# Patient Record
Sex: FEMALE | Race: WHITE | NOT HISPANIC OR LATINO | Employment: FULL TIME | URBAN - METROPOLITAN AREA
[De-identification: names, ages, dates, MRNs, and addresses within clinical notes are randomized per-mention and may not be internally consistent; named-entity substitution may affect disease eponyms.]

---

## 2023-08-07 DIAGNOSIS — G47.00 INSOMNIA, UNSPECIFIED TYPE: ICD-10-CM

## 2023-08-07 RX ORDER — ZOLPIDEM TARTRATE 10 MG/1
1 TABLET ORAL NIGHTLY
COMMUNITY
Start: 2014-12-23 | End: 2023-08-07 | Stop reason: SDUPTHER

## 2023-08-08 RX ORDER — ZOLPIDEM TARTRATE 10 MG/1
10 TABLET ORAL NIGHTLY
Qty: 30 TABLET | Refills: 0 | Status: SHIPPED | OUTPATIENT
Start: 2023-08-08 | End: 2023-11-07 | Stop reason: ALTCHOICE

## 2023-09-28 ENCOUNTER — OFFICE VISIT (OUTPATIENT)
Dept: PRIMARY CARE | Facility: CLINIC | Age: 53
End: 2023-09-28
Payer: COMMERCIAL

## 2023-09-28 VITALS — SYSTOLIC BLOOD PRESSURE: 112 MMHG | DIASTOLIC BLOOD PRESSURE: 72 MMHG

## 2023-09-28 DIAGNOSIS — F51.01 PRIMARY INSOMNIA: Primary | ICD-10-CM

## 2023-09-28 DIAGNOSIS — F41.9 ANXIETY: ICD-10-CM

## 2023-09-28 PROBLEM — S73.191D ACETABULAR LABRUM TEAR, RIGHT, SUBSEQUENT ENCOUNTER: Status: ACTIVE | Noted: 2023-09-28

## 2023-09-28 PROBLEM — E03.9 HYPOTHYROIDISM: Status: ACTIVE | Noted: 2023-09-28

## 2023-09-28 PROCEDURE — 1036F TOBACCO NON-USER: CPT | Performed by: INTERNAL MEDICINE

## 2023-09-28 PROCEDURE — 99213 OFFICE O/P EST LOW 20 MIN: CPT | Performed by: INTERNAL MEDICINE

## 2023-09-28 RX ORDER — LEVOTHYROXINE SODIUM 100 UG/1
1 TABLET ORAL DAILY
COMMUNITY
Start: 2017-06-27 | End: 2023-10-04 | Stop reason: SDUPTHER

## 2023-09-28 RX ORDER — TRAZODONE HYDROCHLORIDE 50 MG/1
50 TABLET ORAL NIGHTLY PRN
Qty: 30 TABLET | Refills: 5 | Status: SHIPPED | OUTPATIENT
Start: 2023-09-28 | End: 2023-10-20

## 2023-09-28 RX ORDER — MELOXICAM 15 MG/1
TABLET ORAL
COMMUNITY
Start: 2023-09-27 | End: 2023-11-07 | Stop reason: ALTCHOICE

## 2023-09-28 RX ORDER — HYDROXYZINE HYDROCHLORIDE 10 MG/1
10 TABLET, FILM COATED ORAL 2 TIMES DAILY PRN
Qty: 30 TABLET | Refills: 0 | Status: SHIPPED | OUTPATIENT
Start: 2023-09-28 | End: 2023-11-13

## 2023-09-28 RX ORDER — ESCITALOPRAM OXALATE 10 MG/1
10 TABLET ORAL DAILY
Qty: 30 TABLET | Refills: 5 | Status: SHIPPED | OUTPATIENT
Start: 2023-09-28 | End: 2023-10-20

## 2023-09-28 NOTE — PROGRESS NOTES
Subjective   Patient ID: Elizabeth Sanchez is a 52 y.o. female.    HPI   patient presents today in follow-up she has had some issues with situational anxiety and insomnia but overall has been very healthy she has had orthopedic issues had surgery for labral tear actually bilateral hips and has been doing pretty well though currently little bit of pain in her right hip was just started back on meloxicam  She has not been sleeping well wakes up frequently and cannot go back to sleep she feels exhausted  Creased stress in her life  Took son to college and not doing well  Daughter looking for college  Mom just diagnosed with lung cancer and will be living with her  while through her treatment the prognosis is not great  3 of hypothyroidism and has not had blood work checked for a while    Tearful and having a hard time coping  She is trying to exercise routinely as this is a great stress reliever for her          Review of Systems    Objective   Physical Exam  Well Developed tearful no acute distress  HEENT exam is unremarkable  Lungs are clear to auscultation and percussion  Cardiovascular regular rate and rhythm    Assessment/Plan   #1 anxiety which I do think is mostly factorial or situational in nature she is going to be seeing a counselor which is good news however we have talked about options and she is just not coping well we will start Lexapro 10 mg daily to take routinely  Trazodone as needed at nighttime for sleep and hydroxyzine on a as needed basis as well she is going to do her blood work in the near future to be sure her thyroid is okay also  2.  Health maintenance  Flu vaccine given today  She is going to return for a comprehensive physical examination and do all of her blood work as well  25 minutes were spent with patient of which greater than 50% was spent in counseling and coordination of care  This note was partially generated using the Dragon voice recognition system.  There may be some incorrect  wording ,grammar, spelling or punctuation errors that were not corrected prior to committing the note to the medical record.

## 2023-09-28 NOTE — PATIENT INSTRUCTIONS
I do feel your anxiety is certainly justified.  You have so much going on in life.  I am glad you are going to be seen a counselor.  I would like to do some blood work in the near future and this will count for your blood work for upcoming physical as well  We are going to start Lexapro (escitalapram) grams daily to be taken every day for the anxiety  We will use trazodone at nighttime for sleep you can take 1/2 to 1 tablet at bedtime.  You can take this every night or more on an as-needed basis as well  I did send a prescription for hydroxyzine 10 mg to take up to twice daily for the extreme anxiety  None of these medications are addictive in nature  Flu vaccine was given today as well  We will have you return for a comprehensive exam in the near future

## 2023-10-04 DIAGNOSIS — E03.8 OTHER SPECIFIED HYPOTHYROIDISM: Primary | ICD-10-CM

## 2023-10-04 RX ORDER — LEVOTHYROXINE SODIUM 100 UG/1
100 TABLET ORAL DAILY
Qty: 90 TABLET | Refills: 3 | Status: SHIPPED | OUTPATIENT
Start: 2023-10-04

## 2023-10-06 ENCOUNTER — TELEPHONE (OUTPATIENT)
Dept: PRIMARY CARE | Facility: CLINIC | Age: 53
End: 2023-10-06
Payer: COMMERCIAL

## 2023-10-13 ENCOUNTER — LAB (OUTPATIENT)
Dept: LAB | Facility: LAB | Age: 53
End: 2023-10-13
Payer: COMMERCIAL

## 2023-10-13 DIAGNOSIS — Z00.00 HEALTH MAINTENANCE EXAMINATION: ICD-10-CM

## 2023-10-13 LAB
25(OH)D3 SERPL-MCNC: 54 NG/ML (ref 30–100)
ALBUMIN SERPL BCP-MCNC: 4.5 G/DL (ref 3.4–5)
ALP SERPL-CCNC: 42 U/L (ref 33–110)
ALT SERPL W P-5'-P-CCNC: 13 U/L (ref 7–45)
ANION GAP SERPL CALC-SCNC: 15 MMOL/L (ref 10–20)
AST SERPL W P-5'-P-CCNC: 15 U/L (ref 9–39)
BACTERIA #/AREA URNS AUTO: ABNORMAL /HPF
BASOPHILS # BLD AUTO: 0.06 X10*3/UL (ref 0–0.1)
BASOPHILS NFR BLD AUTO: 1.6 %
BILIRUB SERPL-MCNC: 1.1 MG/DL (ref 0–1.2)
BUN SERPL-MCNC: 12 MG/DL (ref 6–23)
CALCIUM SERPL-MCNC: 9.5 MG/DL (ref 8.6–10.6)
CHLORIDE SERPL-SCNC: 103 MMOL/L (ref 98–107)
CHOLEST SERPL-MCNC: 163 MG/DL (ref 0–199)
CHOLESTEROL/HDL RATIO: 2.1
CO2 SERPL-SCNC: 26 MMOL/L (ref 21–32)
CREAT SERPL-MCNC: 0.92 MG/DL (ref 0.5–1.05)
CRP SERPL HS-MCNC: 0.4 MG/L
EOSINOPHIL # BLD AUTO: 0.14 X10*3/UL (ref 0–0.7)
EOSINOPHIL NFR BLD AUTO: 3.8 %
ERYTHROCYTE [DISTWIDTH] IN BLOOD BY AUTOMATED COUNT: 13.7 % (ref 11.5–14.5)
EST. AVERAGE GLUCOSE BLD GHB EST-MCNC: 100 MG/DL
GFR SERPL CREATININE-BSD FRML MDRD: 76 ML/MIN/1.73M*2
GLUCOSE SERPL-MCNC: 81 MG/DL (ref 74–99)
HBA1C MFR BLD: 5.1 %
HCT VFR BLD AUTO: 40 % (ref 36–46)
HDLC SERPL-MCNC: 77.6 MG/DL
HGB BLD-MCNC: 12.6 G/DL (ref 12–16)
IMM GRANULOCYTES # BLD AUTO: 0.01 X10*3/UL (ref 0–0.7)
IMM GRANULOCYTES NFR BLD AUTO: 0.3 % (ref 0–0.9)
LDLC SERPL CALC-MCNC: 77 MG/DL
LYMPHOCYTES # BLD AUTO: 1.14 X10*3/UL (ref 1.2–4.8)
LYMPHOCYTES NFR BLD AUTO: 31 %
MCH RBC QN AUTO: 28.1 PG (ref 26–34)
MCHC RBC AUTO-ENTMCNC: 31.5 G/DL (ref 32–36)
MCV RBC AUTO: 89 FL (ref 80–100)
MONOCYTES # BLD AUTO: 0.47 X10*3/UL (ref 0.1–1)
MONOCYTES NFR BLD AUTO: 12.8 %
MUCOUS THREADS #/AREA URNS AUTO: ABNORMAL /LPF
NEUTROPHILS # BLD AUTO: 1.86 X10*3/UL (ref 1.2–7.7)
NEUTROPHILS NFR BLD AUTO: 50.5 %
NON HDL CHOLESTEROL: 85 MG/DL (ref 0–149)
NRBC BLD-RTO: 0 /100 WBCS (ref 0–0)
PLATELET # BLD AUTO: 239 X10*3/UL (ref 150–450)
PMV BLD AUTO: 9.8 FL (ref 7.5–11.5)
POTASSIUM SERPL-SCNC: 4.5 MMOL/L (ref 3.5–5.3)
PROT SERPL-MCNC: 7.5 G/DL (ref 6.4–8.2)
RBC # BLD AUTO: 4.48 X10*6/UL (ref 4–5.2)
RBC #/AREA URNS AUTO: ABNORMAL /HPF
SODIUM SERPL-SCNC: 139 MMOL/L (ref 136–145)
SQUAMOUS #/AREA URNS AUTO: ABNORMAL /HPF
T4 FREE SERPL-MCNC: 1.4 NG/DL (ref 0.78–1.48)
TRIGL SERPL-MCNC: 40 MG/DL (ref 0–149)
TSH SERPL-ACNC: 4.28 MIU/L (ref 0.44–3.98)
VLDL: 8 MG/DL (ref 0–40)
WBC # BLD AUTO: 3.7 X10*3/UL (ref 4.4–11.3)
WBC #/AREA URNS AUTO: ABNORMAL /HPF

## 2023-10-13 PROCEDURE — 80053 COMPREHEN METABOLIC PANEL: CPT

## 2023-10-13 PROCEDURE — 82306 VITAMIN D 25 HYDROXY: CPT

## 2023-10-13 PROCEDURE — 84443 ASSAY THYROID STIM HORMONE: CPT

## 2023-10-13 PROCEDURE — 81001 URINALYSIS AUTO W/SCOPE: CPT

## 2023-10-13 PROCEDURE — 36415 COLL VENOUS BLD VENIPUNCTURE: CPT

## 2023-10-13 PROCEDURE — 86141 C-REACTIVE PROTEIN HS: CPT

## 2023-10-13 PROCEDURE — 83036 HEMOGLOBIN GLYCOSYLATED A1C: CPT

## 2023-10-13 PROCEDURE — 84439 ASSAY OF FREE THYROXINE: CPT

## 2023-10-13 PROCEDURE — 85025 COMPLETE CBC W/AUTO DIFF WBC: CPT

## 2023-10-13 PROCEDURE — 80061 LIPID PANEL: CPT

## 2023-10-20 DIAGNOSIS — F41.9 ANXIETY: ICD-10-CM

## 2023-10-20 DIAGNOSIS — F51.01 PRIMARY INSOMNIA: ICD-10-CM

## 2023-10-20 RX ORDER — TRAZODONE HYDROCHLORIDE 50 MG/1
50 TABLET ORAL NIGHTLY PRN
Qty: 90 TABLET | Refills: 2 | Status: SHIPPED | OUTPATIENT
Start: 2023-10-20 | End: 2023-11-09 | Stop reason: SDUPTHER

## 2023-10-20 RX ORDER — ESCITALOPRAM OXALATE 10 MG/1
10 TABLET ORAL DAILY
Qty: 90 TABLET | Refills: 2 | Status: SHIPPED | OUTPATIENT
Start: 2023-10-20 | End: 2023-11-08 | Stop reason: SDUPTHER

## 2023-10-23 PROBLEM — I49.8 VENTRICULAR BIGEMINY: Status: ACTIVE | Noted: 2023-10-23

## 2023-10-23 PROBLEM — H52.4 PRESBYOPIA: Status: ACTIVE | Noted: 2017-10-09

## 2023-10-23 PROBLEM — R00.2 HEART PALPITATIONS: Status: ACTIVE | Noted: 2023-10-23

## 2023-10-23 PROBLEM — M21.40 PES PLANUS: Status: ACTIVE | Noted: 2023-10-23

## 2023-10-23 PROBLEM — J34.2 DEVIATED NASAL SEPTUM: Status: ACTIVE | Noted: 2023-10-23

## 2023-10-23 PROBLEM — N63.10 LUMP OF RIGHT BREAST: Status: ACTIVE | Noted: 2023-10-23

## 2023-10-23 PROBLEM — J31.0 CHRONIC RHINITIS: Status: ACTIVE | Noted: 2023-10-23

## 2023-10-23 RX ORDER — DOXYCYCLINE 100 MG/1
100 CAPSULE ORAL
COMMUNITY
Start: 2022-10-21 | End: 2023-11-07 | Stop reason: ALTCHOICE

## 2023-10-23 RX ORDER — PHENOL 1.4 %
1 AEROSOL, SPRAY (ML) MUCOUS MEMBRANE DAILY
COMMUNITY
Start: 2018-09-20

## 2023-10-23 RX ORDER — METRONIDAZOLE 10 MG/G
GEL TOPICAL
COMMUNITY
Start: 2022-03-14 | End: 2023-11-07 | Stop reason: ALTCHOICE

## 2023-10-23 RX ORDER — EPINEPHRINE 0.3 MG/.3ML
1 INJECTION SUBCUTANEOUS
COMMUNITY

## 2023-10-23 RX ORDER — LEVONORGESTREL 52 MG/1
INTRAUTERINE DEVICE INTRAUTERINE
COMMUNITY

## 2023-10-23 RX ORDER — ALBUTEROL SULFATE 90 UG/1
2 AEROSOL, METERED RESPIRATORY (INHALATION) EVERY 4 HOURS PRN
COMMUNITY
Start: 2022-11-05 | End: 2023-11-07 | Stop reason: ALTCHOICE

## 2023-10-23 RX ORDER — ASPIRIN 81 MG/1
TABLET ORAL
COMMUNITY
Start: 2022-08-29 | End: 2023-11-07 | Stop reason: ALTCHOICE

## 2023-10-23 RX ORDER — NAPROXEN 500 MG/1
1 TABLET ORAL 2 TIMES DAILY PRN
COMMUNITY
Start: 2020-09-03 | End: 2023-11-07 | Stop reason: ALTCHOICE

## 2023-10-23 RX ORDER — HYDROCORTISONE ACETATE 25 MG/1
25 SUPPOSITORY RECTAL 2 TIMES DAILY
COMMUNITY
Start: 2013-08-27

## 2023-10-25 ENCOUNTER — OFFICE VISIT (OUTPATIENT)
Dept: PRIMARY CARE | Facility: CLINIC | Age: 53
End: 2023-10-25
Payer: COMMERCIAL

## 2023-10-25 ENCOUNTER — OFFICE VISIT (OUTPATIENT)
Dept: ORTHOPEDIC SURGERY | Facility: HOSPITAL | Age: 53
End: 2023-10-25
Payer: COMMERCIAL

## 2023-10-25 VITALS — DIASTOLIC BLOOD PRESSURE: 64 MMHG | SYSTOLIC BLOOD PRESSURE: 100 MMHG

## 2023-10-25 DIAGNOSIS — H00.011 HORDEOLUM EXTERNUM OF RIGHT UPPER EYELID: Primary | ICD-10-CM

## 2023-10-25 DIAGNOSIS — M70.62 TROCHANTERIC BURSITIS, LEFT HIP: ICD-10-CM

## 2023-10-25 DIAGNOSIS — M16.11 PRIMARY OSTEOARTHRITIS OF RIGHT HIP: Primary | ICD-10-CM

## 2023-10-25 DIAGNOSIS — F41.9 ANXIETY: ICD-10-CM

## 2023-10-25 PROCEDURE — 99213 OFFICE O/P EST LOW 20 MIN: CPT | Performed by: PHYSICIAN ASSISTANT

## 2023-10-25 PROCEDURE — 20611 DRAIN/INJ JOINT/BURSA W/US: CPT | Mod: RT | Performed by: PHYSICIAN ASSISTANT

## 2023-10-25 PROCEDURE — 1036F TOBACCO NON-USER: CPT | Performed by: INTERNAL MEDICINE

## 2023-10-25 PROCEDURE — 2500000004 HC RX 250 GENERAL PHARMACY W/ HCPCS (ALT 636 FOR OP/ED): Performed by: PHYSICIAN ASSISTANT

## 2023-10-25 PROCEDURE — 1036F TOBACCO NON-USER: CPT | Performed by: PHYSICIAN ASSISTANT

## 2023-10-25 PROCEDURE — 20610 DRAIN/INJ JOINT/BURSA W/O US: CPT | Mod: LT | Performed by: PHYSICIAN ASSISTANT

## 2023-10-25 PROCEDURE — 2500000005 HC RX 250 GENERAL PHARMACY W/O HCPCS: Performed by: PHYSICIAN ASSISTANT

## 2023-10-25 PROCEDURE — 99213 OFFICE O/P EST LOW 20 MIN: CPT | Performed by: INTERNAL MEDICINE

## 2023-10-25 RX ORDER — LIDOCAINE HYDROCHLORIDE 10 MG/ML
4 INJECTION, SOLUTION EPIDURAL; INFILTRATION; INTRACAUDAL; PERINEURAL
Status: COMPLETED | OUTPATIENT
Start: 2023-10-25 | End: 2023-10-25

## 2023-10-25 RX ORDER — METHYLPREDNISOLONE ACETATE 40 MG/ML
40 INJECTION, SUSPENSION INTRA-ARTICULAR; INTRALESIONAL; INTRAMUSCULAR; SOFT TISSUE
Status: COMPLETED | OUTPATIENT
Start: 2023-10-25 | End: 2023-10-25

## 2023-10-25 RX ORDER — TOBRAMYCIN AND DEXAMETHASONE 3; 1 MG/G; MG/G
0.5 OINTMENT OPHTHALMIC 3 TIMES DAILY
Qty: 15 G | Refills: 0 | Status: SHIPPED | OUTPATIENT
Start: 2023-10-25 | End: 2023-11-07 | Stop reason: ALTCHOICE

## 2023-10-25 RX ADMIN — METHYLPREDNISOLONE ACETATE 40 MG: 40 INJECTION, SUSPENSION INTRA-ARTICULAR; INTRALESIONAL; INTRAMUSCULAR; INTRASYNOVIAL; SOFT TISSUE at 09:13

## 2023-10-25 RX ADMIN — METHYLPREDNISOLONE ACETATE 40 MG: 40 INJECTION, SUSPENSION INTRA-ARTICULAR; INTRALESIONAL; INTRAMUSCULAR; INTRASYNOVIAL; SOFT TISSUE at 09:09

## 2023-10-25 RX ADMIN — LIDOCAINE HYDROCHLORIDE 4 ML: 10 INJECTION, SOLUTION EPIDURAL; INFILTRATION; INTRACAUDAL; PERINEURAL at 09:09

## 2023-10-25 RX ADMIN — LIDOCAINE HYDROCHLORIDE 4 ML: 10 INJECTION, SOLUTION EPIDURAL; INFILTRATION; INTRACAUDAL; PERINEURAL at 09:13

## 2023-10-25 NOTE — PROGRESS NOTES
Subjective   Patient ID: Elizabeth Sanchez is a 52 y.o. female.    HPI  Patient presents today with complaints of stye on her eye over about the last 7 to 10 days she has been using warm compresses and seem to make much of a difference although today it is a little better  She is thrown out all of her old make-up as well  Feels like she is doing okay with the Lexapro from the increased stress her mom had her first procedure stated her home for a while she is just returned to her own house knows this is going to be a long recovery and a whole process but please that she handled first phase as well she did    Review of Systems    Objective   Physical Exam  Well-developed young thin appears younger than age no acute distress  Examination was limited right lower eyelid there is a small erythematous lesion present  Assessment/Plan   Diagnoses and all orders for this visit:  Hordeolum externum of right upper eyelid  -     tobramycin-dexamethasone (Tobradex) ophthalmic ointment; Apply 0.5 inches to right eye 3 times a day for 10 days.  #1 stye or hordeolum right eye has not resolved with conservative means working to try TobraDex ophthalmic ointment we did discuss is okay if little gets in her eye as it is a sterile ointment she does have upcoming appointment with her ophthalmologist if her symptoms would persist  2.  Anxiety stressed doing pretty well with the Lexapro she does feel like it is made a difference  20 minutes were spent with patient of which greater than 50% was spent in counseling and coordination of care  This note was partially generated using the Dragon voice recognition system.  There may be some incorrect wording ,grammar, spelling or punctuation errors that were not corrected prior to committing the note to the medical record.

## 2023-11-07 ENCOUNTER — OFFICE VISIT (OUTPATIENT)
Dept: PRIMARY CARE | Facility: CLINIC | Age: 53
End: 2023-11-07
Payer: COMMERCIAL

## 2023-11-07 VITALS
WEIGHT: 142 LBS | DIASTOLIC BLOOD PRESSURE: 68 MMHG | HEIGHT: 69 IN | BODY MASS INDEX: 21.03 KG/M2 | SYSTOLIC BLOOD PRESSURE: 102 MMHG

## 2023-11-07 DIAGNOSIS — F41.9 ANXIETY: ICD-10-CM

## 2023-11-07 DIAGNOSIS — E03.8 OTHER SPECIFIED HYPOTHYROIDISM: Primary | ICD-10-CM

## 2023-11-07 DIAGNOSIS — Z00.00 ROUTINE HEALTH MAINTENANCE: ICD-10-CM

## 2023-11-07 PROBLEM — S73.191D ACETABULAR LABRUM TEAR, RIGHT, SUBSEQUENT ENCOUNTER: Status: RESOLVED | Noted: 2023-09-28 | Resolved: 2023-11-07

## 2023-11-07 PROCEDURE — UHSPHYS PR UH SELECT PHYSICAL: Performed by: INTERNAL MEDICINE

## 2023-11-07 PROCEDURE — 93000 ELECTROCARDIOGRAM COMPLETE: CPT | Performed by: INTERNAL MEDICINE

## 2023-11-07 PROCEDURE — 1036F TOBACCO NON-USER: CPT | Performed by: INTERNAL MEDICINE

## 2023-11-07 NOTE — PROGRESS NOTES
Physical Exam    Name Elizabeth Sanchez    Date of Service :2023      Elizabeth Sanchez  52 y.o. is here for an annual physical exam  Is an extraordinarily healthy 52-year-old woman  Past medical history most significant for more orthopedic issues  Right hip labral tear status post right hip arthroscopy   Does have a history of fibrocystic breast disease did have diagnostic mammogram and ultrasound this year which was normal  Recommendations were made that she consider self-pay fast breast MRI as well because of her very dense breast tissue  She does not have a family history of breast cancer  She is up-to-date with her gynecologist Dr. Siddiqi  She does have issues with anxiety insomnia for which Lexapro has been helpful really has a lot of situational anxiety and has been under increased stress recently  Hypothyroidism has been on pretty stable dose of Synthroid  She has had a history of palpitations dentally  She remains very active she works out almost on a daily basis and really mixes up her exercise regimen  Overall she is feeling well  history of basal cell skin cancer    Increased stress at this time as her mother's health is an issue had recently been diagnosed with cancer just had pneumonectomy has returned home but knows prognosis is not great.  Her son is struggling he is a freshman in college      Past Medical History:   Diagnosis Date    Encounter for full-term uncomplicated delivery      (spontaneous vaginal delivery)    Other conditions influencing health status     Basal Cell Carcinoma Of Skin, Other Specified Location    Other specified joint disorders, right hip 2022    Femoroacetabular impingement of right hip    Other specified joint disorders, unspecified hip 2020    Femoral acetabular impingement    Other sprain of right hip, initial encounter 2022    Acetabular labrum tear, right, initial encounter    Personal history of other diseases of the digestive system 2013  "   History of hemorrhoids    Personal history of other endocrine, nutritional and metabolic disease     History of hypothyroidism    Varicella without complication     Varicella without complication       Past Surgical History:   Procedure Laterality Date    OTHER SURGICAL HISTORY  12/06/2013    Dental Surgery    OTHER SURGICAL HISTORY  07/02/2019    Rhinoplasty        Social History     Tobacco Use    Smoking status: Never    Smokeless tobacco: Never   Vaping Use    Vaping Use: Never used        Social History     Social History Narrative    Originally from [place]    She worked in [employer] before having children    She is  currently residing in [place]    2 children son who is 19 daughter who is 16    Her diet is healthy    She exercises routinely she does a combination of cardio and resistance training    She has never been a smoker    She occasionally drinks alcohol       Family History   Problem Relation Name Age of Onset    Other (anxiety and depression) Mother      Skin cancer Mother      Hypothyroidism Mother      Hyperlipidemia Mother      Other (sarcomoid carcinoma lung) Mother  78    Skin cancer Father      Hyperlipidemia Father      Skin cancer Brother      Hypertension Other Family Hx     Other (Cardiac failure) Other Family Hx         /68   Ht 1.753 m (5' 9\")   Wt 64.4 kg (142 lb)   BMI 20.97 kg/m²     Physical Exam  Physical examination  Reveals a well-developed woman in no acute distress    appearance is younger than age she is thin  HEENT exam  Extraocular motion is intact  Tympanic membranes and external auditory canals are normal  Oropharynx is normal  There is no cervical lymphadenopathy appreciated  The thyroid is within normal limits    Lungs    clear to auscultation and percussion    Cardiovascular   regular rate and rhythm  No murmurs rubs or gallops are appreciated    Breast examination   No dominant masses nipple discharge or axillary lymphadenopathy is appreciated    Abdomen " "  soft nontender bowel sounds are positive   there is no organomegaly noted      Periphery  Pulses are present without deficits noted  No peripheral edema is noted    Musculoskeletal  Gait is normal  Is no joint erythema or swelling noted  Range of motion is within normal limits  Strength is 5 of 5 without deficits noted    Dermatology  No concerning skin lesions are noted    Neurology  No deficits are noted  Judgment appears appropriate  Mood and affect are appropriate                        No lab exists for component: \"LABALBU\"                       No lab exists for component: \"UAPPEAR\", \"UCOLOR\"  No components found for: \"UA\"  Lab on 10/13/2023   Component Date Value Ref Range Status    WBC 10/13/2023 3.7 (L)  4.4 - 11.3 x10*3/uL Final    nRBC 10/13/2023 0.0  0.0 - 0.0 /100 WBCs Final    RBC 10/13/2023 4.48  4.00 - 5.20 x10*6/uL Final    Hemoglobin 10/13/2023 12.6  12.0 - 16.0 g/dL Final    Hematocrit 10/13/2023 40.0  36.0 - 46.0 % Final    MCV 10/13/2023 89  80 - 100 fL Final    MCH 10/13/2023 28.1  26.0 - 34.0 pg Final    MCHC 10/13/2023 31.5 (L)  32.0 - 36.0 g/dL Final    RDW 10/13/2023 13.7  11.5 - 14.5 % Final    Platelets 10/13/2023 239  150 - 450 x10*3/uL Final    MPV 10/13/2023 9.8  7.5 - 11.5 fL Final    Neutrophils % 10/13/2023 50.5  40.0 - 80.0 % Final    Immature Granulocytes %, Automated 10/13/2023 0.3  0.0 - 0.9 % Final    Immature Granulocyte Count (IG) includes promyelocytes, myelocytes and metamyelocytes but does not include bands. Percent differential counts (%) should be interpreted in the context of the absolute cell counts (cells/UL).    Lymphocytes % 10/13/2023 31.0  13.0 - 44.0 % Final    Monocytes % 10/13/2023 12.8  2.0 - 10.0 % Final    Eosinophils % 10/13/2023 3.8  0.0 - 6.0 % Final    Basophils % 10/13/2023 1.6  0.0 - 2.0 % Final    Neutrophils Absolute 10/13/2023 1.86  1.20 - 7.70 x10*3/uL Final    Percent differential counts (%) should be interpreted in the context of the absolute " cell counts (cells/uL).    Immature Granulocytes Absolute, Au* 10/13/2023 0.01  0.00 - 0.70 x10*3/uL Final    Lymphocytes Absolute 10/13/2023 1.14 (L)  1.20 - 4.80 x10*3/uL Final    Monocytes Absolute 10/13/2023 0.47  0.10 - 1.00 x10*3/uL Final    Eosinophils Absolute 10/13/2023 0.14  0.00 - 0.70 x10*3/uL Final    Basophils Absolute 10/13/2023 0.06  0.00 - 0.10 x10*3/uL Final    Glucose 10/13/2023 81  74 - 99 mg/dL Final    Sodium 10/13/2023 139  136 - 145 mmol/L Final    Potassium 10/13/2023 4.5  3.5 - 5.3 mmol/L Final    Chloride 10/13/2023 103  98 - 107 mmol/L Final    Bicarbonate 10/13/2023 26  21 - 32 mmol/L Final    Anion Gap 10/13/2023 15  10 - 20 mmol/L Final    Urea Nitrogen 10/13/2023 12  6 - 23 mg/dL Final    Creatinine 10/13/2023 0.92  0.50 - 1.05 mg/dL Final    eGFR 10/13/2023 76  >60 mL/min/1.73m*2 Final    Calculations of estimated GFR are performed using the 2021 CKD-EPI Study Refit equation without the race variable for the IDMS-Traceable creatinine methods.  https://jasn.asnjournals.org/content/early/2021/09/22/ASN.9982552084    Calcium 10/13/2023 9.5  8.6 - 10.6 mg/dL Final    Albumin 10/13/2023 4.5  3.4 - 5.0 g/dL Final    Alkaline Phosphatase 10/13/2023 42  33 - 110 U/L Final    Total Protein 10/13/2023 7.5  6.4 - 8.2 g/dL Final    AST 10/13/2023 15  9 - 39 U/L Final    Bilirubin, Total 10/13/2023 1.1  0.0 - 1.2 mg/dL Final    ALT 10/13/2023 13  7 - 45 U/L Final    Patients treated with Sulfasalazine may generate falsely decreased results for ALT.    CRP, High Sensitivity 10/13/2023 0.4 (L)  >=1.0 mg/L Final    Hemoglobin A1C 10/13/2023 5.1  see below % Final    Estimated Average Glucose 10/13/2023 100  Not Established mg/dL Final    Cholesterol 10/13/2023 163  0 - 199 mg/dL Final          Age      Desirable   Borderline High   High     0-19 Y     0 - 169       170 - 199     >/= 200    20-24 Y     0 - 189       190 - 224     >/= 225         >24 Y     0 - 199       200 - 239     >/= 240   **All  ranges are based on fasting samples. Specific   therapeutic targets will vary based on patient-specific   cardiac risk.    Pediatric guidelines reference:Pediatrics 2011, 128(S5).Adult guidelines reference: NCEP ATPIII Guidelines,MALCOLM 2001, 258:2486-97    Venipuncture immediately after or during the administration of Metamizole may lead to falsely low results. Testing should be performed immediately prior to Metamizole dosing.    HDL-Cholesterol 10/13/2023 77.6  mg/dL Final      Age       Very Low   Low     Normal    High    0-19 Y    < 35      < 40     40-45     ----  20-24 Y    ----     < 40      >45      ----        >24 Y      ----     < 40     40-60      >60      Cholesterol/HDL Ratio 10/13/2023 2.1   Final      Ref Values  Desirable  < 3.4  High Risk  > 5.0    LDL Calculated 10/13/2023 77  <=99 mg/dL Final                                Near   Borderline      AGE      Desirable  Optimal    High     High     Very High     0-19 Y     0 - 109     ---    110-129   >/= 130     ----    20-24 Y     0 - 119     ---    120-159   >/= 160     ----      >24 Y     0 -  99   100-129  130-159   160-189     >/=190      VLDL 10/13/2023 8  0 - 40 mg/dL Final    Triglycerides 10/13/2023 40  0 - 149 mg/dL Final       Age         Desirable   Borderline High   High     Very High   0 D-90 D    19 - 174         ----         ----        ----  91 D- 9 Y     0 -  74        75 -  99     >/= 100      ----    10-19 Y     0 -  89        90 - 129     >/= 130      ----    20-24 Y     0 - 114       115 - 149     >/= 150      ----         >24 Y     0 - 149       150 - 199    200- 499    >/= 500    Venipuncture immediately after or during the administration of Metamizole may lead to falsely low results. Testing should be performed immediately prior to Metamizole dosing.    Non HDL Cholesterol 10/13/2023 85  0 - 149 mg/dL Final          Age       Desirable   Borderline High   High     Very High     0-19 Y     0 - 119       120 - 144     >/= 145     >/= 160    20-24 Y     0 - 149       150 - 189     >/= 190      ----         >24 Y    30 mg/dL above LDL Cholesterol goal      Thyroid Stimulating Hormone 10/13/2023 4.28 (H)  0.44 - 3.98 mIU/L Final    WBC, Urine 10/13/2023 1-5  1-5, NONE /HPF Final    RBC, Urine 10/13/2023 1-2  NONE, 1-2, 3-5 /HPF Final    Squamous Epithelial Cells, Urine 10/13/2023 1-9 (SPARSE)  Reference range not established. /HPF Final    Bacteria, Urine 10/13/2023 1+ (A)  NONE SEEN /HPF Final    Mucus, Urine 10/13/2023 1+  Reference range not established. /LPF Final    Vitamin D, 25-Hydroxy, Total 10/13/2023 54  30 - 100 ng/mL Final    Thyroxine, Free 10/13/2023 1.40  0.78 - 1.48 ng/dL Final     [unfilled]   No results found.   The 10-year ASCVD risk score (Keaton SIMENTAL, et al., 2019) is: 0.4%    Values used to calculate the score:      Age: 52 years      Sex: Female      Is Non- : No      Diabetic: No      Tobacco smoker: No      Systolic Blood Pressure: 102 mmHg      Is BP treated: No      HDL Cholesterol: 77.6 mg/dL      Total Cholesterol: 163 mg/dL   .  ECG: normal sinus rhythm, no blocks or conduction defects, no ischemic changes.     Problem List Items Addressed This Visit       Hypothyroidism - Primary    Anxiety     Other Visit Diagnoses       Routine health maintenance        Relevant Orders    ECG 12 Lead            Assessment/Plan   #1 anxiety she is doing really well with the Lexapro we will continue Lexapro she has rarely used hydroxyzine not sure how well it worked she does have a lot of stress in her life right now  2.  Insomnia trazodone is working well is fine to use it every day  3.  Hypothyroidism thyroid functions are normal  4.  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  Orthopedic issues sports related injuries seems to be doing well she had labral repair she does have aches and pains on occasion but she is very active  6.  Health maintenance  Congratulated her  on a very healthy lifestyle she exercises routinely she eats healthfully her blood work is all acceptable  She is up-to-date with gynecology  Up-to-date with mammogram we discussed considering screening with fast breast MRI self-pay she will consider although we did discuss that we have to be cautious in premenopausal women as can get false positives  She has had colonoscopy she is a 5-year repeat because of polyps so repeat 2028  Shingrix vaccination is recommended

## 2023-11-07 NOTE — PATIENT INSTRUCTIONS
It was good to see you.  You are doing a good job with your overall health care.   Your blood work is all acceptable  I am glad the Lexapro is working well for you  Stay up-to-date with gynecology  Consider self-pay rapid breast MRI because of your dense breast however I agree you do not have any strong family history of breast cancer  I believe this is a $250 out-of-pocket cost  Continue routine regular exercise I think you are doing great in the way you mix up your exercise  Repeat colonoscopy 2028  Shingrix or shingles vaccination is recommended. This is a series of 2 vaccinations. The second vaccination is given 2-6 months following the first.   I encourage you to stay active and healthy, and to follow these healthy habits:     Try to increase your intake of fish such as salmon and tuna and try to get 2 to 3 servings of fish per week.  Increase your intake of plant-based protein listed here:    Unprocessed nuts, walnuts, or almonds, Nuts and Seeds.      Green vegetables such as Broccoli, Peas, Greens, Spinach    Beans, Chickpeas, & Lentils    Other sources include Potatoes, Quinoa, Seaweed, Soymilk, Tempeh, and Tofu.  Increase food s higher in flavonoids found in black tea, green tea, apples, nuts, citrus fruit, berries, and dark chocolate.  You should avoid fried foods, and sugary or starchy foods and sugary drinks, and void saturated fats.    Try not to dine at restaurants frequently  , and avoid fast food restaurants.      Try to get restful sleep approximately 7-9 hours every night.  Work towards getting 30 minutes of  moderate intensity exercise 4 to 5 days per week.  You should also try to exercise at least one hour per day with light walking.

## 2023-11-08 DIAGNOSIS — F41.9 ANXIETY: ICD-10-CM

## 2023-11-08 RX ORDER — ESCITALOPRAM OXALATE 10 MG/1
10 TABLET ORAL DAILY
Qty: 90 TABLET | Refills: 3 | Status: SHIPPED | OUTPATIENT
Start: 2023-11-08

## 2023-11-09 DIAGNOSIS — F51.01 PRIMARY INSOMNIA: ICD-10-CM

## 2023-11-09 RX ORDER — TRAZODONE HYDROCHLORIDE 50 MG/1
50 TABLET ORAL NIGHTLY PRN
Qty: 90 TABLET | Refills: 2 | Status: SHIPPED | OUTPATIENT
Start: 2023-11-09 | End: 2024-11-08

## 2023-11-13 DIAGNOSIS — F41.9 ANXIETY: ICD-10-CM

## 2023-11-13 RX ORDER — HYDROXYZINE HYDROCHLORIDE 10 MG/1
10 TABLET, FILM COATED ORAL 2 TIMES DAILY PRN
Qty: 30 TABLET | Refills: 0 | Status: SHIPPED | OUTPATIENT
Start: 2023-11-13 | End: 2023-11-21 | Stop reason: SDUPTHER

## 2023-11-14 NOTE — PROGRESS NOTES
Physical Therapy  Physical Therapy Orthopedic Evaluation    Patient Name: Elizabeth Sanchez  MRN: [id number]  Today's Date: 11/15/2023  Time Calculation  Start Time: 1400  Stop Time: 1455  Time Calculation (min): 55 min    Insurance:  Visit number: 1 of 38  Authorization info: NAN  Insurance Type: Medical [employer]    General:  Reason for visit: Bilateral hip pain   Referred by: Francoise Chung    Current Problem  1. Bilateral hip pain  Follow Up In Physical Therapy      2. Hip stiffness  Follow Up In Physical Therapy      3. Muscle weakness  Follow Up In Physical Therapy      4. Stiffness of right hip joint [M25.651]  Follow Up In Physical Therapy          Precautions: Hx R hip scope July 2022       Medical History Form: Reviewed (scanned into chart)    Subjective:     Chief Complaint: 53 year old female presents with complaints of bilateral hip pain. Previously treated patient in 2022 after her right hip arthroscopy labral repair. Patient did very well with rehab and was pain free for quite some time. Recently both of her hips have begun bothering her. Right groin and left lateral hip. Pt recently had injections and referred for PT. Pt is an avid exerciser. The injection in her right hip helped a great deal but the injection in her left greater trochanter.     RADHA: Insidious    Current Condition:   Better    Pain:  Pain Assessment: 0-10  Pain Score: 2  Location: right groin, left lateral hip/bursa  2/10 current  6/10 worst at night   Description: sharp, throb, ache   Aggravating Factors: sleeping  Exercise- yoga, pickle ball, spin     Relevant Information (PMH & Previous Tests/Imaging): bilateral mild OA   Previous Interventions/Treatments: None    Prior Level of Function (PLOF)  Patient previously independent with all ADLs    Patients Living Environment: Reviewed and no concern  PMH: hypothyroidism, anxiety -both managed   Primary Language: English    Patient's Goal(s) for Therapy: reduce pain     Red Flags: Do you have  any of the following? No  Fever/chills, unexplained weight changes, dizziness/fainting, unexplained change in bowel or bladder functions, unexplained malaise or muscle weakness, night pain/sweats, numbness or tingling    Objective:  Objective       ROM    Lumbar AROM (%)    WNL      Hip AROM (Degrees)      (R)  (L)  Flexion: 130   130   Extension: WNL  WNL    Abduction: WNL  WNL    Adduction: WNL  Slight restriction    ER:  42  35    IR:  45  45        Hip PROM (Degrees)      (R)  (L)   ER:  50  45            Strength Testing    Core/Abdominals: 4+/5    Hip      (R)  (L)  Flexion: 5  5     Extension: 4+  4+    Abduction: 5  4+    Adduction: 4+  4+    ER:  4+  4+    IR:  5  5          Functional Screening  Squat: WNL, mild right hip impingement > 90 deg      Posture: + L hip upslip       Palpation: TTP with increased tone L TFL, glute med, proximal quad/distal psoas       Joint Mobility: inferior capsular restriction R hip       Flexibility: mod limited L ITB, hip flexor      Gait: WNL        Special Tests    Ferny's Test: + L restriction   Chandler Test: L ITB restriction  SCOTTIE Test: normal  Hip Scour: neg  Hip Impingement Test: neg  Trendelenburg Test: neg  Glute med derotation normal       Outcome Measures:  Other Measures  Lower Extremity Funtional Score (LEFS): 72/80     EDUCATION: home exercise program, plan of care, activity modifications, pain management, and injury pathology, dry needling indications and risks        Goals: Set and discussed today  Active       PT Problem       PT Goal 1       Start:  11/15/23    Expected End:  02/07/24       Patient will report <4/10 pain in R hip by 4 weeks at night, < 2/10 night pain by 6 weeks   AROM L hip ER 45 degrees by 4-6 weeks, smooth right hip flexion AROM to demonstrate improved joint mechanics to perform ADLs   B hip strength: abduction, ER, extension, glute max  5/5 MMT to demonstrate pelvic stability during ADLs and higher level functional tasks   LEFS > 76/80  "to demonstrate improved ability to perform ADLs by 6 - 8 weeks                Plan of care was developed with input and agreement by the patient      Treatment Performed:    Therapeutic Exercise:    5 min  Reviewed glute bridge, clamshells, s/l hip abduction  CKTC stretch 3 x 30 \" B   Foam rolling TFL, gluteals, ITB     Manual Therapy:    25 min  DN: 0.3x85 mm to L TFL, glute med, proximal quad  Belted hip mobs B ER/IR, caudal R   Theragun L quad/ITB      Assessment: Patient presents with signs and symptoms consistent with right hip mobility restrictions and left hip greater trochanteric bursitis. Limitations include hip weakness, mobility restrictions, ITB limitations and this is resulting in limited participation in pain-free ADLs and inability to perform at their prior level of function. Pt would benefit from physical therapy to address the impairments found & listed previously in the objective section in order to return to safe and pain-free ADLs and prior level of function.     Reduced tone after needling.     Plan:     Planned Interventions include: therapeutic exercise, self-care home management, manual therapy, therapeutic activities, gait training, neuromuscular coordination, vasopneumatic, dry needling, aquatic therapy  Frequency: 1-2 x Week  Duration: 8 Weeks      Radha Mccoy, PT   "

## 2023-11-15 ENCOUNTER — EVALUATION (OUTPATIENT)
Dept: PHYSICAL THERAPY | Facility: HOSPITAL | Age: 53
End: 2023-11-15
Payer: COMMERCIAL

## 2023-11-15 DIAGNOSIS — M25.551 BILATERAL HIP PAIN: Primary | ICD-10-CM

## 2023-11-15 DIAGNOSIS — M25.552 BILATERAL HIP PAIN: Primary | ICD-10-CM

## 2023-11-15 DIAGNOSIS — M25.659 HIP STIFFNESS: ICD-10-CM

## 2023-11-15 DIAGNOSIS — M62.81 MUSCLE WEAKNESS: ICD-10-CM

## 2023-11-15 DIAGNOSIS — M25.651 STIFFNESS OF RIGHT HIP JOINT: ICD-10-CM

## 2023-11-15 PROCEDURE — 97140 MANUAL THERAPY 1/> REGIONS: CPT | Mod: GP

## 2023-11-15 PROCEDURE — 97161 PT EVAL LOW COMPLEX 20 MIN: CPT | Mod: GP

## 2023-11-15 ASSESSMENT — PAIN - FUNCTIONAL ASSESSMENT: PAIN_FUNCTIONAL_ASSESSMENT: 0-10

## 2023-11-15 ASSESSMENT — ENCOUNTER SYMPTOMS
LOSS OF SENSATION IN FEET: 0
OCCASIONAL FEELINGS OF UNSTEADINESS: 0
DEPRESSION: 0

## 2023-11-15 ASSESSMENT — PAIN SCALES - GENERAL: PAINLEVEL_OUTOF10: 2

## 2023-11-21 DIAGNOSIS — F41.9 ANXIETY: ICD-10-CM

## 2023-11-21 RX ORDER — HYDROXYZINE HYDROCHLORIDE 10 MG/1
10 TABLET, FILM COATED ORAL 2 TIMES DAILY PRN
Qty: 30 TABLET | Refills: 0 | Status: SHIPPED | OUTPATIENT
Start: 2023-11-21 | End: 2023-12-01

## 2023-11-21 RX ORDER — HYDROXYZINE HYDROCHLORIDE 10 MG/1
10 TABLET, FILM COATED ORAL 2 TIMES DAILY PRN
Qty: 180 TABLET | Refills: 1 | OUTPATIENT
Start: 2023-11-21

## 2023-12-05 ENCOUNTER — APPOINTMENT (OUTPATIENT)
Dept: PHYSICAL THERAPY | Facility: HOSPITAL | Age: 53
End: 2023-12-05
Payer: COMMERCIAL

## 2023-12-15 ENCOUNTER — TREATMENT (OUTPATIENT)
Dept: PHYSICAL THERAPY | Facility: HOSPITAL | Age: 53
End: 2023-12-15
Payer: COMMERCIAL

## 2023-12-15 DIAGNOSIS — M25.652 STIFFNESS OF LEFT HIP JOINT: ICD-10-CM

## 2023-12-15 DIAGNOSIS — M25.551 BILATERAL HIP PAIN: Primary | ICD-10-CM

## 2023-12-15 DIAGNOSIS — M25.552 BILATERAL HIP PAIN: Primary | ICD-10-CM

## 2023-12-15 DIAGNOSIS — M25.651 STIFFNESS OF RIGHT HIP JOINT: ICD-10-CM

## 2023-12-15 DIAGNOSIS — M62.81 MUSCLE WEAKNESS: ICD-10-CM

## 2023-12-15 PROCEDURE — 97110 THERAPEUTIC EXERCISES: CPT | Mod: GP

## 2023-12-15 PROCEDURE — 97140 MANUAL THERAPY 1/> REGIONS: CPT | Mod: GP

## 2023-12-15 ASSESSMENT — PAIN SCALES - GENERAL: PAINLEVEL_OUTOF10: 2

## 2023-12-15 ASSESSMENT — PAIN - FUNCTIONAL ASSESSMENT: PAIN_FUNCTIONAL_ASSESSMENT: 0-10

## 2024-04-12 ENCOUNTER — TELEPHONE (OUTPATIENT)
Dept: PRIMARY CARE | Facility: CLINIC | Age: 54
End: 2024-04-12
Payer: COMMERCIAL

## 2024-04-12 NOTE — TELEPHONE ENCOUNTER
Pt is calling in regards to taking lexapro and trazodone and is having some questions about them - RAZIA

## 2024-05-07 ENCOUNTER — TREATMENT (OUTPATIENT)
Dept: PHYSICAL THERAPY | Facility: HOSPITAL | Age: 54
End: 2024-05-07

## 2024-05-07 DIAGNOSIS — M25.651 STIFFNESS OF RIGHT HIP JOINT: ICD-10-CM

## 2024-05-07 DIAGNOSIS — M25.652 STIFFNESS OF LEFT HIP JOINT: ICD-10-CM

## 2024-05-07 DIAGNOSIS — M25.551 BILATERAL HIP PAIN: Primary | ICD-10-CM

## 2024-05-07 DIAGNOSIS — M62.81 MUSCLE WEAKNESS: ICD-10-CM

## 2024-05-07 DIAGNOSIS — M25.552 BILATERAL HIP PAIN: Primary | ICD-10-CM

## 2024-05-07 PROCEDURE — 4200000004 HC PT PHASE II 15 MIN CHG: Mod: GP

## 2024-05-07 NOTE — PROGRESS NOTES
Phase II Visit - Rehabilitation Services    Patient Name: Elizabeth Sanchez  MRN: [id number]  Today's Date: 5/7/2024         Visit number: 1      Current Problem:  Patient is being seen at [facility] for cash-based (phase II) visit for: R hip pain dry needling         Subjective:  Patient familiar to me for treatment of right hip pain/surgery returns with acute R hip pain. Patient is training for 1.5 mile open water swim in beginning on June. Last week she swam for 50 minutes straight (1.5 miles) and had hip pain afterwards. Complaints of anterior hip pain with ambulation, feels like it is catching.          Objective:  Pain: 3-10/10   Grabs with hip extension with walking   Pt training for 1.5 mile swim   + hypertonicity R psoas, proximal rectus   Antalgic gait entering clinic    Treatment Performed:  DN 0.3 x 60 mm to R psoas, adductors  Belted lateral hip glide grade III and IR MWM         Assessment:  Patient educated on dry needling precautions, indications and contraindications. Patient is aware of risks and benefits of procedure and provided informed consent. No adverse reactions to needling. No antalgia with gait after session with reduced pain. Continues to have mild hypertonicity of psoas       Plan:  Continue with needling PRN.       Radha Mccoy, PT

## 2024-05-10 NOTE — PROGRESS NOTES
Phase II Visit - Rehabilitation Services    Patient Name: Elizabeth Sanchez  MRN: [id number]  Today's Date: 5/13/2024         Visit number: 2      Current Problem:  Patient is being seen at [facility] for cash-based (phase II) visit for: R hip pain dry needling         Subjective:  Patient reports her hip felt better after last session. She has swam once since last session and felt it a little bit afterwards. Continues to have some soreness in the front of the hip.         Objective:  Pain: 4/10   Grabs with hip extension with walking   Pt training for 1.5 mile swim   + hypertonicity R psoas, proximal rectus, glute med      Treatment Performed:  DN 0.3 x 60 mm to R psoas, glute med   Caudal hip scooping mob      Assessment:  Patient had trigger points which were reduced after needling. Decreased pain and improved gait after session.       Plan:  Continue with needling PRN.       Radha Mccoy, PT

## 2024-05-13 ENCOUNTER — TREATMENT (OUTPATIENT)
Dept: PHYSICAL THERAPY | Facility: HOSPITAL | Age: 54
End: 2024-05-13

## 2024-05-13 DIAGNOSIS — M25.552 BILATERAL HIP PAIN: Primary | ICD-10-CM

## 2024-05-13 DIAGNOSIS — M62.81 MUSCLE WEAKNESS: ICD-10-CM

## 2024-05-13 DIAGNOSIS — M25.651 STIFFNESS OF RIGHT HIP JOINT: ICD-10-CM

## 2024-05-13 DIAGNOSIS — M25.551 BILATERAL HIP PAIN: Primary | ICD-10-CM

## 2024-05-13 PROCEDURE — 4200000004 HC PT PHASE II 15 MIN CHG: Mod: GP

## 2024-05-14 ENCOUNTER — TELEPHONE (OUTPATIENT)
Dept: PRIMARY CARE | Facility: CLINIC | Age: 54
End: 2024-05-14
Payer: COMMERCIAL

## 2024-05-14 DIAGNOSIS — F41.9 ANXIETY: Primary | ICD-10-CM

## 2024-05-14 RX ORDER — DULOXETIN HYDROCHLORIDE 30 MG/1
30 CAPSULE, DELAYED RELEASE ORAL DAILY
Qty: 30 CAPSULE | Refills: 11 | Status: SHIPPED | OUTPATIENT
Start: 2024-05-14 | End: 2025-05-14

## 2024-05-14 NOTE — PROGRESS NOTES
Spoke with patient by phone was using Lexapro which worked well for her anxiety however she had a lot of issues with sexual dysfunction decreased libido stopping the Lexapro did resolve this issue.  However she is feeling more anxious is interested in trying something else and we have discussed options we will try duloxetine 30 mg daily prescription sent to pharmacy

## 2024-05-14 NOTE — TELEPHONE ENCOUNTER
She stopped Lexapro and her libido is much better is there something else she can try instead?  [facility] on file

## 2024-06-03 ENCOUNTER — TREATMENT (OUTPATIENT)
Dept: PHYSICAL THERAPY | Facility: HOSPITAL | Age: 54
End: 2024-06-03

## 2024-06-03 DIAGNOSIS — M62.81 MUSCLE WEAKNESS: ICD-10-CM

## 2024-06-03 DIAGNOSIS — M25.651 STIFFNESS OF RIGHT HIP JOINT: ICD-10-CM

## 2024-06-03 DIAGNOSIS — M25.552 BILATERAL HIP PAIN: Primary | ICD-10-CM

## 2024-06-03 DIAGNOSIS — M25.551 BILATERAL HIP PAIN: Primary | ICD-10-CM

## 2024-06-03 PROCEDURE — 4200000004 HC PT PHASE II 15 MIN CHG: Mod: GP

## 2024-06-03 NOTE — PROGRESS NOTES
Phase II Visit - Rehabilitation Services    Patient Name: Elizabeth Sanchez  MRN: [id number]  Today's Date: 6/3/2024  Time Calculation  Start Time: 1505  Stop Time: 1520  Time Calculation (min): 15 min      Visit number: 3      Current Problem:  Patient is being seen at [facility] for cash-based (phase II) visit for: R hip pain        Subjective:  Patient reports her hip has been feeling great- no pain for the past week. Her race is this weekend.         Objective:  Pain: 4/10   Pt training for 1.5 mile swim   Mild hypertonicity adductors       Treatment Performed:  Belted hip mobs  DTM R adductors   Caudal hip scooping mob      Assessment:  Improved joint mobility after manual therapy.       Plan:  Continue with needling PRN.       Radha Mccoy, PT

## 2024-06-05 ENCOUNTER — TREATMENT (OUTPATIENT)
Dept: PHYSICAL THERAPY | Facility: HOSPITAL | Age: 54
End: 2024-06-05

## 2024-06-05 DIAGNOSIS — M25.652 STIFFNESS OF LEFT HIP JOINT: Primary | ICD-10-CM

## 2024-06-05 DIAGNOSIS — M25.551 BILATERAL HIP PAIN: ICD-10-CM

## 2024-06-05 DIAGNOSIS — M25.552 BILATERAL HIP PAIN: ICD-10-CM

## 2024-06-05 DIAGNOSIS — M25.651 STIFFNESS OF RIGHT HIP JOINT: ICD-10-CM

## 2024-06-05 PROCEDURE — 4200000004 HC PT PHASE II 15 MIN CHG: Mod: GP

## 2024-06-05 NOTE — PROGRESS NOTES
Phase II Visit - Rehabilitation Services    Patient Name: Elizabeth Sanchez  MRN: [id number]  Today's Date: 6/5/2024         Visit number:4      Current Problem:  Patient is being seen at [facility] for cash-based (phase II) visit for: back pain         Subjective:  Patient was performing kettlebell swings yesterday and felt a pull in her back. Pain has become progressively worse over the past day. Pain with transitional movements         Objective:  Pain: 4/10     Closing restriction L3 on L4 and L5 on L5   +TTP L paraspinals, QL       Treatment Performed:  DN: 0.3 x 50mm to B paraspinals 3 levels, L QL  MET in side lyign lumbar roll positioning for L3 on l4 and L4 on L5       Assessment:  Improved mobility in lumbar spine, reduced pain with transitional movements after manual therapy. Pt educated to alternate heat/ice, gentle stretching, hydrate.       Plan:  Continue with needling PRN.       Radha Mccoy, PT

## 2024-06-06 ASSESSMENT — PAIN SCALES - GENERAL: PAINLEVEL_OUTOF10: 6

## 2024-06-06 ASSESSMENT — PAIN - FUNCTIONAL ASSESSMENT: PAIN_FUNCTIONAL_ASSESSMENT: 0-10

## 2024-06-11 ENCOUNTER — TELEPHONE (OUTPATIENT)
Dept: PRIMARY CARE | Facility: CLINIC | Age: 54
End: 2024-06-11
Payer: COMMERCIAL

## 2024-06-11 NOTE — TELEPHONE ENCOUNTER
Pt is calling in regards to the new Antidepressant and its been since 5/14 and she doesn't think it working and the way she feels on it is different from when she took lexapro - JMP

## 2024-07-29 ENCOUNTER — TREATMENT (OUTPATIENT)
Dept: PHYSICAL THERAPY | Facility: HOSPITAL | Age: 54
End: 2024-07-29

## 2024-07-29 DIAGNOSIS — M25.651 STIFFNESS OF RIGHT HIP JOINT: ICD-10-CM

## 2024-07-29 DIAGNOSIS — M62.81 MUSCLE WEAKNESS: ICD-10-CM

## 2024-07-29 DIAGNOSIS — M25.652 STIFFNESS OF LEFT HIP JOINT: Primary | ICD-10-CM

## 2024-07-29 PROCEDURE — 4200000004 HC PT PHASE II 15 MIN CHG: Mod: GP

## 2024-07-29 NOTE — PROGRESS NOTES
Phase II Visit - Rehabilitation Services    Patient Name: Elizabeth Sanchez  MRN: [id number]  Today's Date: 7/29/2024  Time Calculation  Start Time: 1230  Stop Time: 1245  Time Calculation (min): 15 min      Visit number: 5       Current Problem:  Patient is being seen at [facility] for cash-based (phase II) visit for: back pain         Subjective:  Patient reports her QL has been tight for a few weeks- she has a specific pressure point that has been bothering her. Her hip flexor feels tight as well.         Objective:  Pain: 2/10     + trigger points R psoas/rectus, L QL       Treatment Performed:  DN: 0.3 x 50mm  L QL, R psoas caution of inguinal area and neurovascular structures.       Assessment:  Improved tone in muscles after needling, pt symptoms were reproduced deep in tissues which would explain why traditional STM did not alleviated symptoms. No adverse reaction to needling.       Plan:  Continue with needling PRN.       Radha Mccoy, PT

## 2024-08-20 DIAGNOSIS — F51.01 PRIMARY INSOMNIA: ICD-10-CM

## 2024-08-20 RX ORDER — TRAZODONE HYDROCHLORIDE 50 MG/1
50 TABLET ORAL NIGHTLY PRN
Qty: 90 TABLET | Refills: 3 | Status: SHIPPED | OUTPATIENT
Start: 2024-08-20

## 2024-09-10 DIAGNOSIS — E03.8 OTHER SPECIFIED HYPOTHYROIDISM: ICD-10-CM

## 2024-09-10 RX ORDER — LEVOTHYROXINE SODIUM 100 UG/1
100 TABLET ORAL DAILY
Qty: 90 TABLET | Refills: 3 | Status: SHIPPED | OUTPATIENT
Start: 2024-09-10

## 2024-10-17 ENCOUNTER — TELEPHONE (OUTPATIENT)
Dept: OBSTETRICS AND GYNECOLOGY | Facility: CLINIC | Age: 54
End: 2024-10-17
Payer: COMMERCIAL

## 2024-10-21 NOTE — PROGRESS NOTES
ASSESSMENT/PLAN  Encounter for well woman exam with routine gynecological exam (Primary)  Normal routine well woman exam.  No pap done.   Last pap 2023 was normal and HPV negative.     Encounter for screening mammogram for breast cancer  Your clinical breast exam was normal.   A screening mammogram is scheduled.  Dense breasts on imaging. We discussed the options including fast breast MRI which pt is considering this year.     Will likely remove Mirena IUD next year when menopausal.          SUBJECTIVE    PAP 2- NEG, HPV NEG  MAMMO 3-  R BREAST US CYST  DEXA NEVER  COLON 2-2-23   Mirena IUD 9-     HPI    52 yo presents for routine well woman visit.  Last visit 2/2023 At that time pt had palpable breast lump. Mammogram and ultrasound 3/2023, benign cysts. Has upcoming screening mammogram scheduled.  Denies any major medical or surgical issues.   Mirena IUD replaced 2019.   Cycles irregular, skipping months. No cycle June until Oct. No major menopausal sx.  Hypothyroidism on levothyroxine.   Son is [facility]. Considering bringing him home. Dtr is Sr in HS, applying to colleges.  , no smoke, denies excessive ETOH.        REVIEW OF SYSTEMS    Constitutional: no fever, no chills, no recent weight gain, no recent weight loss, no fatigue.   Eyes: no eye pain, no vision problems, no dryness of the eyes.   ENT: no hearing loss, no nosebleeds, no sinus congestion.   Cardiovascular: no chest pain, no palpitations.  Respiratory: no shortness of breath, no cough, no wheezing.   Gastrointestinal: no abdominal pain, no constipation, no nausea, no diarrhea, no vomiting.   Genitourinary: no pelvic pain, no dysuria, no urinary incontinence, no change in urinary frequency, no vaginal dryness, no vaginal itching,  no vaginal discharge, no unexplained vaginal bleeding, no lesion/sore.   Musculoskeletal: no back pain, no joint swelling, no leg edema.   Integumentary: no rashes, no skin lesions,  "no breast pain, no nipple discharge, no breast lump.   Neurological: no headache, no numbness, no dizziness.   Psychiatric: no sleep disturbances, no anxiety, no depression.   Endocrine: no hot flashes, no loss of hair, no hirsutism.   Hematologic/Lymphatic: no swollen glands, no tendency for easy bleeding, no tendency for easy bruising.      OBJECTIVE    /68   Ht 1.753 m (5' 9\")   Wt 67.1 kg (148 lb)   LMP 10/12/2024 (Exact Date)   BMI 21.86 kg/m²      Physical Exam     Constitutional: Alert and in no acute distress. Well developed, well nourished   Head and Face: Head and face: normal   Eyes: Normal external exam - nonicteric sclera.  Ears, Nose, Mouth, and Throat: External inspection of ears and nose: normal   Neck: no neck asymmetry. Supple and thyroid not enlarged and there were no palpable thyroid nodules   Cardiovascular: Heart rate and rhythm were normal  Pulmonary: No respiratory distress and clear bilateral breath sounds   Chest: Breasts: normal appearance, no nipple discharge, no skin changes. Palpation of breasts and axillae: no palpable mass, no axillary lymphadenopathy, dense fibrocystic breasts on exam bilaterally.  Abdomen: soft nontender; no abdominal mass palpated, no organomegaly and no hernias   Genitourinary: external genitalia: normal appearing vulva and labia without lesions. No inguinal lymphadenopathy,    Urethra: normal.   Bladder: normal on palpation.   Perianal area: normal   Vagina: normal, without significant discharge, no lesions.  Cervix: Normal appearing without lesions. IUD strings at os.  Uterus: Normal, mobile, nontender.  Right and left adnexa/parametria: Normal  Skin: normal skin color and pigmentation, normal skin turgor, and no rash  Psychiatric: alert and oriented x 3., affect normal to patient baseline and mood: appropriate        Luz Elena Siddiqi MD    "

## 2024-10-24 ENCOUNTER — APPOINTMENT (OUTPATIENT)
Dept: OBSTETRICS AND GYNECOLOGY | Facility: CLINIC | Age: 54
End: 2024-10-24
Payer: COMMERCIAL

## 2024-10-24 VITALS
BODY MASS INDEX: 21.92 KG/M2 | WEIGHT: 148 LBS | SYSTOLIC BLOOD PRESSURE: 120 MMHG | DIASTOLIC BLOOD PRESSURE: 68 MMHG | HEIGHT: 69 IN

## 2024-10-24 DIAGNOSIS — Z01.419 ENCOUNTER FOR WELL WOMAN EXAM WITH ROUTINE GYNECOLOGICAL EXAM: Primary | ICD-10-CM

## 2024-10-24 DIAGNOSIS — Z12.31 ENCOUNTER FOR SCREENING MAMMOGRAM FOR BREAST CANCER: ICD-10-CM

## 2024-10-24 PROCEDURE — 3008F BODY MASS INDEX DOCD: CPT | Performed by: OBSTETRICS & GYNECOLOGY

## 2024-10-24 PROCEDURE — 99396 PREV VISIT EST AGE 40-64: CPT | Performed by: OBSTETRICS & GYNECOLOGY

## 2024-10-24 PROCEDURE — 1036F TOBACCO NON-USER: CPT | Performed by: OBSTETRICS & GYNECOLOGY

## 2024-10-29 ENCOUNTER — APPOINTMENT (OUTPATIENT)
Dept: RADIOLOGY | Facility: CLINIC | Age: 54
End: 2024-10-29
Payer: COMMERCIAL

## 2024-10-29 ENCOUNTER — HOSPITAL ENCOUNTER (OUTPATIENT)
Dept: RADIOLOGY | Facility: HOSPITAL | Age: 54
Discharge: HOME | End: 2024-10-29
Payer: COMMERCIAL

## 2024-10-29 VITALS — BODY MASS INDEX: 21.48 KG/M2 | WEIGHT: 145 LBS | HEIGHT: 69 IN

## 2024-10-29 DIAGNOSIS — Z12.31 ENCOUNTER FOR SCREENING MAMMOGRAM FOR BREAST CANCER: ICD-10-CM

## 2024-10-29 DIAGNOSIS — R92.8 ABNORMAL MAMMOGRAM OF RIGHT BREAST: Primary | ICD-10-CM

## 2024-10-29 PROCEDURE — 77067 SCR MAMMO BI INCL CAD: CPT | Performed by: RADIOLOGY

## 2024-10-29 PROCEDURE — 77063 BREAST TOMOSYNTHESIS BI: CPT

## 2024-10-29 PROCEDURE — 77063 BREAST TOMOSYNTHESIS BI: CPT | Performed by: RADIOLOGY

## 2024-11-18 ENCOUNTER — HOSPITAL ENCOUNTER (OUTPATIENT)
Dept: RADIOLOGY | Facility: HOSPITAL | Age: 54
Discharge: HOME | End: 2024-11-18
Payer: COMMERCIAL

## 2024-11-18 DIAGNOSIS — R92.8 ABNORMAL MAMMOGRAM OF RIGHT BREAST: ICD-10-CM

## 2024-11-18 PROCEDURE — 77061 BREAST TOMOSYNTHESIS UNI: CPT | Mod: RIGHT SIDE | Performed by: RADIOLOGY

## 2024-11-18 PROCEDURE — 77065 DX MAMMO INCL CAD UNI: CPT | Mod: RIGHT SIDE | Performed by: RADIOLOGY

## 2024-11-18 PROCEDURE — 77061 BREAST TOMOSYNTHESIS UNI: CPT | Mod: RT

## 2025-01-21 DIAGNOSIS — K64.8 OTHER HEMORRHOIDS: Primary | ICD-10-CM

## 2025-01-21 RX ORDER — HYDROCORTISONE ACETATE 25 MG/1
25 SUPPOSITORY RECTAL 2 TIMES DAILY PRN
Qty: 12 SUPPOSITORY | Refills: 1 | Status: SHIPPED | OUTPATIENT
Start: 2025-01-21

## 2025-06-12 DIAGNOSIS — Z00.00 ROUTINE HEALTH MAINTENANCE: ICD-10-CM

## 2025-06-12 DIAGNOSIS — E03.8 OTHER SPECIFIED HYPOTHYROIDISM: ICD-10-CM

## 2025-06-12 RX ORDER — LEVOTHYROXINE SODIUM 100 UG/1
100 TABLET ORAL DAILY
Qty: 90 TABLET | Refills: 3 | Status: SHIPPED | OUTPATIENT
Start: 2025-06-12

## 2025-06-18 ENCOUNTER — LAB (OUTPATIENT)
Dept: LAB | Facility: HOSPITAL | Age: 55
End: 2025-06-18
Payer: COMMERCIAL

## 2025-06-18 DIAGNOSIS — Z00.00 ENCOUNTER FOR GENERAL ADULT MEDICAL EXAMINATION WITHOUT ABNORMAL FINDINGS: Primary | ICD-10-CM

## 2025-06-18 LAB
25(OH)D3 SERPL-MCNC: 52 NG/ML (ref 30–100)
ALBUMIN SERPL BCP-MCNC: 4.5 G/DL (ref 3.4–5)
ALP SERPL-CCNC: 53 U/L (ref 33–110)
ALT SERPL W P-5'-P-CCNC: 17 U/L (ref 7–45)
ANION GAP SERPL CALC-SCNC: 10 MMOL/L (ref 10–20)
APPEARANCE UR: CLEAR
AST SERPL W P-5'-P-CCNC: 16 U/L (ref 9–39)
BACTERIA #/AREA URNS AUTO: ABNORMAL /HPF
BASOPHILS # BLD AUTO: 0.06 X10*3/UL (ref 0–0.1)
BASOPHILS NFR BLD AUTO: 1.6 %
BILIRUB SERPL-MCNC: 0.9 MG/DL (ref 0–1.2)
BILIRUB UR STRIP.AUTO-MCNC: NEGATIVE MG/DL
BUN SERPL-MCNC: 15 MG/DL (ref 6–23)
CALCIUM SERPL-MCNC: 9.5 MG/DL (ref 8.6–10.6)
CHLORIDE SERPL-SCNC: 106 MMOL/L (ref 98–107)
CHOLEST SERPL-MCNC: 182 MG/DL (ref 0–199)
CHOLESTEROL/HDL RATIO: 2
CO2 SERPL-SCNC: 30 MMOL/L (ref 21–32)
COLOR UR: COLORLESS
CREAT SERPL-MCNC: 0.92 MG/DL (ref 0.5–1.05)
CRP SERPL HS-MCNC: 0.5 MG/L
EGFRCR SERPLBLD CKD-EPI 2021: 75 ML/MIN/1.73M*2
EOSINOPHIL # BLD AUTO: 0.18 X10*3/UL (ref 0–0.7)
EOSINOPHIL NFR BLD AUTO: 4.7 %
ERYTHROCYTE [DISTWIDTH] IN BLOOD BY AUTOMATED COUNT: 13.5 % (ref 11.5–14.5)
EST. AVERAGE GLUCOSE BLD GHB EST-MCNC: 100 MG/DL
GLUCOSE SERPL-MCNC: 76 MG/DL (ref 74–99)
GLUCOSE UR STRIP.AUTO-MCNC: NORMAL MG/DL
HBA1C MFR BLD: 5.1 % (ref ?–5.7)
HCT VFR BLD AUTO: 42.4 % (ref 36–46)
HDLC SERPL-MCNC: 93.2 MG/DL
HGB BLD-MCNC: 12.9 G/DL (ref 12–16)
IMM GRANULOCYTES # BLD AUTO: 0.01 X10*3/UL (ref 0–0.7)
IMM GRANULOCYTES NFR BLD AUTO: 0.3 % (ref 0–0.9)
KETONES UR STRIP.AUTO-MCNC: NEGATIVE MG/DL
LDLC SERPL CALC-MCNC: 81 MG/DL
LEUKOCYTE ESTERASE UR QL STRIP.AUTO: ABNORMAL
LYMPHOCYTES # BLD AUTO: 1.12 X10*3/UL (ref 1.2–4.8)
LYMPHOCYTES NFR BLD AUTO: 29.3 %
MCH RBC QN AUTO: 28.4 PG (ref 26–34)
MCHC RBC AUTO-ENTMCNC: 30.4 G/DL (ref 32–36)
MCV RBC AUTO: 93 FL (ref 80–100)
MONOCYTES # BLD AUTO: 0.35 X10*3/UL (ref 0.1–1)
MONOCYTES NFR BLD AUTO: 9.2 %
NEUTROPHILS # BLD AUTO: 2.1 X10*3/UL (ref 1.2–7.7)
NEUTROPHILS NFR BLD AUTO: 54.9 %
NITRITE UR QL STRIP.AUTO: NEGATIVE
NON HDL CHOLESTEROL: 89 MG/DL (ref 0–149)
NRBC BLD-RTO: 0 /100 WBCS (ref 0–0)
PH UR STRIP.AUTO: 7.5 [PH]
PLATELET # BLD AUTO: 248 X10*3/UL (ref 150–450)
POTASSIUM SERPL-SCNC: 4.5 MMOL/L (ref 3.5–5.3)
PROT SERPL-MCNC: 7.5 G/DL (ref 6.4–8.2)
PROT UR STRIP.AUTO-MCNC: NEGATIVE MG/DL
RBC # BLD AUTO: 4.54 X10*6/UL (ref 4–5.2)
RBC # UR STRIP.AUTO: NEGATIVE MG/DL
RBC #/AREA URNS AUTO: ABNORMAL /HPF
SODIUM SERPL-SCNC: 141 MMOL/L (ref 136–145)
SP GR UR STRIP.AUTO: 1.01
SQUAMOUS #/AREA URNS AUTO: ABNORMAL /HPF
T4 FREE SERPL-MCNC: 1.4 NG/DL (ref 0.78–1.48)
TRIGL SERPL-MCNC: 39 MG/DL (ref 0–149)
TSH SERPL-ACNC: 4.48 MIU/L (ref 0.44–3.98)
UROBILINOGEN UR STRIP.AUTO-MCNC: NORMAL MG/DL
VLDL: 8 MG/DL (ref 0–40)
WBC # BLD AUTO: 3.8 X10*3/UL (ref 4.4–11.3)
WBC #/AREA URNS AUTO: ABNORMAL /HPF

## 2025-06-18 PROCEDURE — 85025 COMPLETE CBC W/AUTO DIFF WBC: CPT

## 2025-06-18 PROCEDURE — 80053 COMPREHEN METABOLIC PANEL: CPT

## 2025-06-18 PROCEDURE — 84439 ASSAY OF FREE THYROXINE: CPT

## 2025-06-18 PROCEDURE — 36415 COLL VENOUS BLD VENIPUNCTURE: CPT

## 2025-06-18 PROCEDURE — 86141 C-REACTIVE PROTEIN HS: CPT

## 2025-06-18 PROCEDURE — 80061 LIPID PANEL: CPT

## 2025-06-18 PROCEDURE — 84443 ASSAY THYROID STIM HORMONE: CPT

## 2025-06-18 PROCEDURE — 83036 HEMOGLOBIN GLYCOSYLATED A1C: CPT

## 2025-06-18 PROCEDURE — 87077 CULTURE AEROBIC IDENTIFY: CPT

## 2025-06-18 PROCEDURE — 81001 URINALYSIS AUTO W/SCOPE: CPT

## 2025-06-18 PROCEDURE — 82306 VITAMIN D 25 HYDROXY: CPT

## 2025-06-18 PROCEDURE — 87086 URINE CULTURE/COLONY COUNT: CPT

## 2025-06-19 LAB
HOLD SPECIMEN: NORMAL

## 2025-06-20 LAB — BACTERIA UR CULT: ABNORMAL

## 2025-06-30 NOTE — PROGRESS NOTES
Physical Exam    Name Elizabeth Sanchez    Date of Service :7/2/2025      Elizabeth Sanchez  54 y.o. is here for an annual physical exam  She has enjoyed very good health  Past medical history most significant for more orthopedic issues  Right hip labral tear status post right hip arthroscopy 2022  Does have a history of fibrocystic breast disease did have diagnostic mammogram and ultrasound this year which was normal  Recommendations were made that she consider self-pay fast breast MRI as well because of her very dense breast tissue  She does not have a family history of breast cancer  She is up-to-date with her gynecologist Dr. Siddiqi  She does have issues with anxiety insomnia for which Lexapro has been helpful really has a lot of situational anxiety and has been under increased stress recently  Hypothyroidism has been on pretty stable dose of Synthroid  She has had a history of palpitations   She remains very active she works out almost on a daily basis and really mixes up her exercise regimen  Overall she is feeling well  history of basal cell skin cancer  She has had some history of anxiety more situational currently seems to be doing pretty well even though she does have some increased stress in her life and could be moving her mom to [facility] her son had to withdraw from college having more academic issues probably ADD   Her daughter is going to be going to college this year as well    She is currently having some issues with pain in her thumbs bilaterally difficult to open a jar do a plank and some of the other yoga moves no particular time of the day that it is worse    Increased stress    Medical History[1]    Surgical History[2]     Social History[3]     Social History     Social History Narrative    Originally from [place]    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    She is    for 24 currently residing in [place]    2 children son who is 19 daughter who is 16    Her diet is healthy    She  "exercises routinely she does a combination of cardio and resistance training    She has never been a smoker    She occasionally drinks alcohol       Family History[4]     /64   Pulse 72   Ht 1.753 m (5' 9\")   Wt 63 kg (138 lb 14.2 oz)   SpO2 99%   BMI 20.51 kg/m²   InBody Scan    Weight 139.3   BMI 20.6  Percent body fat 23.4  Visceral fat 6  Physical Exam    Physical examination  Reveals a well-developed woman in no acute distress    appearance is younger than stated age she is tall and thin  HEENT exam  Extraocular motion is intact  Tympanic membranes and external auditory canals are normal  Oropharynx is normal  There is no cervical lymphadenopathy appreciated  The thyroid is within normal limits    Lungs    clear to auscultation and percussion    Cardiovascular   regular rate and rhythm  No murmurs rubs or gallops are appreciated    Breast examination   No dominant masses nipple discharge or axillary lymphadenopathy is appreciated    Abdomen   soft nontender bowel sounds are positive   there is no organomegaly noted      Periphery  Pulses are present without deficits noted  No peripheral edema is noted    Musculoskeletal  Gait is normal  Is no joint erythema or swelling noted  Range of motion is within normal limits  Strength is 5 of 5 without deficits noted  Hands at the metacarpal carpal joint there are changes consistent with DJD pain to palpation mild as well    Dermatology  No concerning skin lesions are noted  Few hyperpigmented skin lesions    Neurology  No deficits are noted  Judgment appears appropriate  Mood and affect are appropriate                    No lab exists for component: \"LABALBU\"                       No lab exists for component: \"UAPPEAR\", \"UCOLOR\"  No components found for: \"UA\"  Lab on 06/18/2025   Component Date Value Ref Range Status    Extra Tube 06/19/2025 Hold for add-ons.   Final    Auto resulted.    Extra Tube 06/19/2025 Hold for add-ons.   Final    Auto resulted.    " Extra Tube 06/19/2025 Hold for add-ons.   Final    Auto resulted.   Orders Only on 06/12/2025   Component Date Value Ref Range Status    WBC 06/18/2025 3.8 (L)  4.4 - 11.3 x10*3/uL Final    nRBC 06/18/2025 0.0  0.0 - 0.0 /100 WBCs Final    RBC 06/18/2025 4.54  4.00 - 5.20 x10*6/uL Final    Hemoglobin 06/18/2025 12.9  12.0 - 16.0 g/dL Final    Hematocrit 06/18/2025 42.4  36.0 - 46.0 % Final    MCV 06/18/2025 93  80 - 100 fL Final    MCH 06/18/2025 28.4  26.0 - 34.0 pg Final    MCHC 06/18/2025 30.4 (L)  32.0 - 36.0 g/dL Final    RDW 06/18/2025 13.5  11.5 - 14.5 % Final    Platelets 06/18/2025 248  150 - 450 x10*3/uL Final    Neutrophils % 06/18/2025 54.9  40.0 - 80.0 % Final    Immature Granulocytes %, Automated 06/18/2025 0.3  0.0 - 0.9 % Final    Immature Granulocyte Count (IG) includes promyelocytes, myelocytes and metamyelocytes but does not include bands. Percent differential counts (%) should be interpreted in the context of the absolute cell counts (cells/UL).    Lymphocytes % 06/18/2025 29.3  13.0 - 44.0 % Final    Monocytes % 06/18/2025 9.2  2.0 - 10.0 % Final    Eosinophils % 06/18/2025 4.7  0.0 - 6.0 % Final    Basophils % 06/18/2025 1.6  0.0 - 2.0 % Final    Neutrophils Absolute 06/18/2025 2.10  1.20 - 7.70 x10*3/uL Final    Percent differential counts (%) should be interpreted in the context of the absolute cell counts (cells/uL).    Immature Granulocytes Absolute, Au* 06/18/2025 0.01  0.00 - 0.70 x10*3/uL Final    Lymphocytes Absolute 06/18/2025 1.12 (L)  1.20 - 4.80 x10*3/uL Final    Monocytes Absolute 06/18/2025 0.35  0.10 - 1.00 x10*3/uL Final    Eosinophils Absolute 06/18/2025 0.18  0.00 - 0.70 x10*3/uL Final    Basophils Absolute 06/18/2025 0.06  0.00 - 0.10 x10*3/uL Final    Glucose 06/18/2025 76  74 - 99 mg/dL Final    Sodium 06/18/2025 141  136 - 145 mmol/L Final    Potassium 06/18/2025 4.5  3.5 - 5.3 mmol/L Final    Chloride 06/18/2025 106  98 - 107 mmol/L Final    Bicarbonate 06/18/2025 30  21  - 32 mmol/L Final    Anion Gap 06/18/2025 10  10 - 20 mmol/L Final    Urea Nitrogen 06/18/2025 15  6 - 23 mg/dL Final    Creatinine 06/18/2025 0.92  0.50 - 1.05 mg/dL Final    eGFR 06/18/2025 75  >60 mL/min/1.73m*2 Final    Calculations of estimated GFR are performed using the 2021 CKD-EPI Study Refit equation without the race variable for the IDMS-Traceable creatinine methods.  https://jasn.asnjournals.org/content/early/2021/09/22/ASN.1380189247    Calcium 06/18/2025 9.5  8.6 - 10.6 mg/dL Final    Albumin 06/18/2025 4.5  3.4 - 5.0 g/dL Final    Alkaline Phosphatase 06/18/2025 53  33 - 110 U/L Final    Total Protein 06/18/2025 7.5  6.4 - 8.2 g/dL Final    AST 06/18/2025 16  9 - 39 U/L Final    Bilirubin, Total 06/18/2025 0.9  0.0 - 1.2 mg/dL Final    ALT 06/18/2025 17  7 - 45 U/L Final    Patients treated with Sulfasalazine may generate falsely decreased results for ALT.    Cholesterol 06/18/2025 182  0 - 199 mg/dL Final          Age      Desirable   Borderline High   High     0-19 Y     0 - 169       170 - 199     >/= 200    20-24 Y     0 - 189       190 - 224     >/= 225         >24 Y     0 - 199       200 - 239     >/= 240   **All ranges are based on fasting samples. Specific   therapeutic targets will vary based on patient-specific   cardiac risk.    Pediatric guidelines reference:Pediatrics 2011, 128(S5).Adult guidelines reference: NCEP ATPIII Guidelines,MALCOLM 2001, 258:2486-97    Venipuncture immediately after or during the administration of Metamizole may lead to falsely low results. Testing should be performed immediately prior to Metamizole dosing.    HDL-Cholesterol 06/18/2025 93.2  mg/dL Final      Age       Very Low   Low     Normal    High    0-19 Y    < 35      < 40     40-45     ----  20-24 Y    ----     < 40      >45      ----        >24 Y      ----     < 40     40-60      >60      Cholesterol/HDL Ratio 06/18/2025 2.0   Final      Ref Values  Desirable  < 3.4  High Risk  > 5.0    LDL Calculated  06/18/2025 81  <=99 mg/dL Final                                Near   Borderline      AGE      Desirable  Optimal    High     High     Very High     0-19 Y     0 - 109     ---    110-129   >/= 130     ----    20-24 Y     0 - 119     ---    120-159   >/= 160     ----      >24 Y     0 -  99   100-129  130-159   160-189     >/=190      VLDL 06/18/2025 8  0 - 40 mg/dL Final    Triglycerides 06/18/2025 39  0 - 149 mg/dL Final    Age              Desirable        Borderline         High        Very High  SEX:B           mg/dL             mg/dL               mg/dL      mg/dL  <=14D                       ----               ----        ----  15D-365D                    ----               ----        ----  1Y-9Y           0-74               75-99             >=100       ----  10Y-19Y        0-89                            >=130       ----  20Y-24Y        0-114             115-149             >=150      ----  >= 25Y         0-149             150-199             200-499    >=500      Venipuncture immediately after or during the administration of Metamizole may lead to falsely low results. Testing should be performed immediately prior to Metamizole dosing.    Non HDL Cholesterol 06/18/2025 89  0 - 149 mg/dL Final          Age       Desirable   Borderline High   High     Very High     0-19 Y     0 - 119       120 - 144     >/= 145    >/= 160    20-24 Y     0 - 149       150 - 189     >/= 190      ----         >24 Y    30 mg/dL above LDL Cholesterol goal      Thyroid Stimulating Hormone 06/18/2025 4.48 (H)  0.44 - 3.98 mIU/L Final    CRP, High Sensitivity 06/18/2025 0.5  <1.0 mg/L Final    Vitamin D, 25-Hydroxy, Total 06/18/2025 52  30 - 100 ng/mL Final    Hemoglobin A1C 06/18/2025 5.1  See comment % Final    Estimated Average Glucose 06/18/2025 100  Not Established mg/dL Final    Color, Urine 06/18/2025 Colorless (N)  Light-Yellow, Yellow, Dark-Yellow Final    Appearance, Urine 06/18/2025 Clear  Clear Final     Specific Gravity, Urine 06/18/2025 1.006  1.005 - 1.035 Final    pH, Urine 06/18/2025 7.5  5.0, 5.5, 6.0, 6.5, 7.0, 7.5, 8.0 Final    Protein, Urine 06/18/2025 NEGATIVE  NEGATIVE, 10 (TRACE), 20 (TRACE) mg/dL Final    Glucose, Urine 06/18/2025 Normal  Normal mg/dL Final    Blood, Urine 06/18/2025 NEGATIVE  NEGATIVE mg/dL Final    Ketones, Urine 06/18/2025 NEGATIVE  NEGATIVE mg/dL Final    Bilirubin, Urine 06/18/2025 NEGATIVE  NEGATIVE mg/dL Final    Urobilinogen, Urine 06/18/2025 Normal  Normal mg/dL Final    Nitrite, Urine 06/18/2025 NEGATIVE  NEGATIVE Final    Leukocyte Esterase, Urine 06/18/2025 75 Jp/uL (A)  NEGATIVE Final    WBC, Urine 06/18/2025 NONE  1-5, NONE /HPF Final    RBC, Urine 06/18/2025 1-2  NONE, 1-2, 3-5 /HPF Final    Squamous Epithelial Cells, Urine 06/18/2025 1-9 (SPARSE)  Reference range not established. /HPF Final    Bacteria, Urine 06/18/2025 1+ (A)  NONE SEEN /HPF Final    Urine Culture 06/18/2025 >=100,000 CFU/mL Streptococcus agalactiae (Group B Streptococcus) (A)   Final    Comment: Streptococcus agalactiae (Group B Streptococcus, GBS) may be significant in pregnant individuals. Recovery from non-pregnant individuals likely represents an insignificant finding. Routine GBS prenatal screening should be ordered using test “Group B                            Streptococcus (GBS) Prenatal Screen, Culture” ([id number]).    Thyroxine, Free 06/18/2025 1.40  0.78 - 1.48 ng/dL Final     [unfilled]   Imaging  No results found.    Cardiology, Vascular, and Other Imaging  ECG 12 lead (Clinic Performed)  Result Date: 7/2/2025  Normal sinus rhythm normal EKG       The 10-year ASCVD risk score (Keaton SIMENTAL, et al., 2019) is: 0.5%    Values used to calculate the score:      Age: 54 years      Sex: Female      Is Non- : No      Diabetic: No      Tobacco smoker: No      Systolic Blood Pressure: 100 mmHg      Is BP treated: No      HDL Cholesterol: 93.2 mg/dL      Total Cholesterol: 182  mg/dL   .  ECG: Normal sinus rhythm normal-appearing EKG,    Problem List Items Addressed This Visit    None  Visit Diagnoses         Pain in both hands    -  Primary    Relevant Orders    Referral to Orthopedics and Sports Medicine      Palpitations        Relevant Orders    ECG 12 lead (Clinic Performed) (Completed)      Skin lesion        Relevant Orders    Referral to Dermatology      Other hemorrhoids        Relevant Medications    Anusol-HC 25 mg suppository            Assessment/Plan   #1 hypothyroidism thyroid functions are acceptable  2.  Arthralgias as did have history of labral tear hip doing well now after arthroscopy few years ago now with pain most consistent with DJD discussed using Voltaren gel I have referred her to orthopedic hand specialist she may benefit from an injection  3.  Hemorrhoids she did find the Anusol suppositories to work well prescription sent  4.  Dense breast tissue we did discuss possible self-pay breast MRI in addition to screening mammograms which I have recommended she schedule  #5 insomnia she uses occasional trazodone not continuously  6.  Anxiety more situational really seems to be doing quite well at this point even with all of her stressors  7.  Health maintenance  Congratulated her on healthy lifestyle  She is up-to-date with mammogram will consider screening MRI  Last colonoscopy  3 repeat   Up-to-date with gynecology  Recommended Shingrix vaccination as well as Prevnar 20 she will return for these as did not want to do prior to holiday weekend in case side effects         [1]   Past Medical History:  Diagnosis Date    Encounter for full-term uncomplicated delivery      (spontaneous vaginal delivery)    Other specified joint disorders, right hip 2022    Femoroacetabular impingement of right hip    Other sprain of right hip, initial encounter 2022    Acetabular labrum tear, right, initial encounter   [2]   Past Surgical History:  Procedure  Laterality Date    HIP SURGERY  2022    OTHER SURGICAL HISTORY  12/06/2013    Dental Surgery    OTHER SURGICAL HISTORY  07/02/2019    Rhinoplasty   [3]   Social History  Tobacco Use    Smoking status: Never    Smokeless tobacco: Never   Vaping Use    Vaping status: Never Used   Substance Use Topics    Alcohol use: Yes    Drug use: Never   [4]   Family History  Problem Relation Name Age of Onset    Other (anxiety and depression) Mother      Skin cancer Mother      Hypothyroidism Mother      Hyperlipidemia Mother      Other (sarcomoid carcinoma lung) Mother  78    Melanoma Mother      Skin cancer Father      Hyperlipidemia Father      Skin cancer Brother      Hypertension Other Family Hx     Other (Cardiac failure) Other Family Hx

## 2025-07-02 ENCOUNTER — APPOINTMENT (OUTPATIENT)
Dept: PRIMARY CARE | Facility: CLINIC | Age: 55
End: 2025-07-02
Payer: COMMERCIAL

## 2025-07-02 VITALS
HEART RATE: 72 BPM | HEIGHT: 69 IN | DIASTOLIC BLOOD PRESSURE: 64 MMHG | BODY MASS INDEX: 20.57 KG/M2 | SYSTOLIC BLOOD PRESSURE: 100 MMHG | OXYGEN SATURATION: 99 % | WEIGHT: 138.89 LBS

## 2025-07-02 DIAGNOSIS — L98.9 SKIN LESION: ICD-10-CM

## 2025-07-02 DIAGNOSIS — R00.2 PALPITATIONS: ICD-10-CM

## 2025-07-02 DIAGNOSIS — K64.8 OTHER HEMORRHOIDS: ICD-10-CM

## 2025-07-02 DIAGNOSIS — M79.642 PAIN IN BOTH HANDS: Primary | ICD-10-CM

## 2025-07-02 DIAGNOSIS — M79.641 PAIN IN BOTH HANDS: Primary | ICD-10-CM

## 2025-07-02 RX ORDER — HYDROCORTISONE ACETATE 25 MG/1
25 SUPPOSITORY RECTAL 2 TIMES DAILY PRN
Qty: 12 SUPPOSITORY | Refills: 1 | Status: SHIPPED | OUTPATIENT
Start: 2025-07-02

## 2025-07-02 NOTE — PATIENT INSTRUCTIONS
It was good to see you.  You are doing a good job with your overall health care.  All blood work is acceptable  As we discussed we will have you see Dr. Matamoros for hand pain.  Until then try Voltaren gel or diclofenac gel up to 4 times daily symptoms can really be helpful for hands  Stay up-to-date with gynecology  We discussed I do think you would be a good candidate for self-pay screening MRI of breast and would do this 6 months after your next mammogram  You are up-to-date with colonoscopy due in  I do recommend shingles vaccination as well as Prevnar 20 you can just call our office to schedule that  Shingrix or shingles vaccination is recommended. This is a series of 2 vaccinations. The second vaccination is given 2-6 months following the first.   Repeat colonoscopy 2028  [facility] [place]

## 2025-07-03 DIAGNOSIS — K64.0 GRADE I HEMORRHOIDS: Primary | ICD-10-CM

## 2025-07-03 RX ORDER — HYDROCORTISONE ACETATE 25 MG/1
25 SUPPOSITORY RECTAL 2 TIMES DAILY
Qty: 12 SUPPOSITORY | Refills: 0 | Status: SHIPPED | OUTPATIENT
Start: 2025-07-03 | End: 2025-07-13

## 2025-07-07 DIAGNOSIS — K64.0 GRADE I HEMORRHOIDS: ICD-10-CM

## 2025-07-07 RX ORDER — HYDROCORTISONE ACETATE 25 MG/1
25 SUPPOSITORY RECTAL 2 TIMES DAILY
Qty: 36 SUPPOSITORY | Refills: 3 | Status: SHIPPED | OUTPATIENT
Start: 2025-07-07

## 2025-07-17 ENCOUNTER — OFFICE VISIT (OUTPATIENT)
Dept: OBSTETRICS AND GYNECOLOGY | Facility: CLINIC | Age: 55
End: 2025-07-17
Payer: COMMERCIAL

## 2025-07-17 VITALS — BODY MASS INDEX: 21.27 KG/M2 | DIASTOLIC BLOOD PRESSURE: 62 MMHG | SYSTOLIC BLOOD PRESSURE: 110 MMHG | WEIGHT: 144 LBS

## 2025-07-17 DIAGNOSIS — N63.10 MASS OF RIGHT BREAST, UNSPECIFIED QUADRANT: Primary | ICD-10-CM

## 2025-07-17 PROCEDURE — 99214 OFFICE O/P EST MOD 30 MIN: CPT | Performed by: OBSTETRICS & GYNECOLOGY

## 2025-07-17 PROCEDURE — 1036F TOBACCO NON-USER: CPT | Performed by: OBSTETRICS & GYNECOLOGY

## 2025-07-17 NOTE — PROGRESS NOTES
"  ASSESSMENT/PLAN    1. Mass of right breast, unspecified quadrant (Primary)  Right breast mass.   Recommend diagnostic mammogram and ultrasound at [facility].  Extremely dense breasts on imaging. Discussed referral to high risk breast center in the future.   - BI mammo right diagnostic; Future  - BI US breast limited right; Future       SUBJECTIVE    HPI    55 yo notes breast lump of right breast found on exam this week.   Nontender. No other breast changes.  Last screening mammogram 10/29/2024 called back for distortion on imaging R breast. 11/18/2024 right breast diagnostic mammogram normal.   Pt with \"extremely dense\"breasts on imaging\". We discussed fast breast MRI at last visit and pt notes she did not pursue.       ROS    Constitutional: no fever, no chills, no recent weight gain, no recent weight loss, no fatigue.   Integumentary: no rashes, no skin lesions, no breast pain, no nipple discharge, + breast lump.       OBJECTIVE    /62   Wt 65.3 kg (144 lb)   LMP 06/07/2025 (Exact Date)   BMI 21.27 kg/m²        Physical Exam     Constitutional: Alert and in no acute distress. Well developed, well nourished   Chest: Breasts: normal appearance, no nipple discharge, no skin changes and palpation of breasts and axillae: fibrocystic changes both breasts, right breast 9 clock, 3 cm from nipple is a 5 mm rounded , smooth borders, mobile mass and no axillary lymphadenopathy   Skin: normal skin color and pigmentation, normal skin turgor, and no rash.   Psychiatric: alert and oriented x 3., affect normal to patient baseline and mood: appropriate        Luz Elena Siddiqi MD   "

## 2025-07-22 ENCOUNTER — HOSPITAL ENCOUNTER (OUTPATIENT)
Dept: RADIOLOGY | Facility: CLINIC | Age: 55
Discharge: HOME | End: 2025-07-22
Payer: COMMERCIAL

## 2025-07-22 DIAGNOSIS — N63.10 MASS OF RIGHT BREAST, UNSPECIFIED QUADRANT: ICD-10-CM

## 2025-07-22 PROCEDURE — 77065 DX MAMMO INCL CAD UNI: CPT | Mod: RIGHT SIDE | Performed by: RADIOLOGY

## 2025-07-22 PROCEDURE — 76642 ULTRASOUND BREAST LIMITED: CPT | Mod: RIGHT SIDE | Performed by: RADIOLOGY

## 2025-07-22 PROCEDURE — 76642 ULTRASOUND BREAST LIMITED: CPT | Mod: RT

## 2025-07-22 PROCEDURE — 77065 DX MAMMO INCL CAD UNI: CPT | Mod: RT

## 2025-07-22 PROCEDURE — 76982 USE 1ST TARGET LESION: CPT

## 2025-07-22 PROCEDURE — 77061 BREAST TOMOSYNTHESIS UNI: CPT | Mod: RIGHT SIDE | Performed by: RADIOLOGY

## 2025-08-04 DIAGNOSIS — M79.645 BILATERAL THUMB PAIN: Primary | ICD-10-CM

## 2025-08-04 DIAGNOSIS — M25.532 LEFT WRIST PAIN: ICD-10-CM

## 2025-08-04 DIAGNOSIS — M79.644 BILATERAL THUMB PAIN: Primary | ICD-10-CM

## 2025-08-06 ENCOUNTER — TELEPHONE (OUTPATIENT)
Dept: PRIMARY CARE | Facility: CLINIC | Age: 55
End: 2025-08-06
Payer: COMMERCIAL

## 2025-08-06 DIAGNOSIS — F41.9 ANXIETY: Primary | ICD-10-CM

## 2025-08-06 RX ORDER — HYDROXYZINE HYDROCHLORIDE 10 MG/1
10 TABLET, FILM COATED ORAL EVERY 8 HOURS PRN
Qty: 30 TABLET | Refills: 1 | Status: SHIPPED | OUTPATIENT
Start: 2025-08-06 | End: 2025-09-05

## 2025-08-06 NOTE — TELEPHONE ENCOUNTER
She is feeling more anxious and waking up a lot at night. She took of the Trazodone last night and slept for 4 hours. Can she take more? You had given her Hydroxyzine last year could she take that if she wakes up at 2 or 4? Any suggestions?

## 2025-08-07 ENCOUNTER — APPOINTMENT (OUTPATIENT)
Dept: ORTHOPEDIC SURGERY | Facility: HOSPITAL | Age: 55
End: 2025-08-07
Payer: COMMERCIAL

## 2025-08-07 ENCOUNTER — HOSPITAL ENCOUNTER (OUTPATIENT)
Dept: RADIOLOGY | Facility: HOSPITAL | Age: 55
Discharge: HOME | End: 2025-08-07
Payer: COMMERCIAL

## 2025-08-07 DIAGNOSIS — M18.0 ARTHRITIS OF CARPOMETACARPAL (CMC) JOINT OF BOTH THUMBS: Primary | ICD-10-CM

## 2025-08-07 DIAGNOSIS — M79.645 BILATERAL THUMB PAIN: ICD-10-CM

## 2025-08-07 DIAGNOSIS — M79.644 BILATERAL THUMB PAIN: ICD-10-CM

## 2025-08-07 DIAGNOSIS — M25.532 LEFT WRIST PAIN: ICD-10-CM

## 2025-08-07 PROCEDURE — 20606 DRAIN/INJ JOINT/BURSA W/US: CPT | Mod: RT | Performed by: ORTHOPAEDIC SURGERY

## 2025-08-07 PROCEDURE — 73110 X-RAY EXAM OF WRIST: CPT | Mod: LT

## 2025-08-07 PROCEDURE — 99203 OFFICE O/P NEW LOW 30 MIN: CPT | Mod: 25 | Performed by: ORTHOPAEDIC SURGERY

## 2025-08-07 PROCEDURE — 2500000004 HC RX 250 GENERAL PHARMACY W/ HCPCS (ALT 636 FOR OP/ED): Performed by: ORTHOPAEDIC SURGERY

## 2025-08-07 PROCEDURE — A4467 BELT STRAP SLEEV GRMNT COVER: HCPCS | Performed by: ORTHOPAEDIC SURGERY

## 2025-08-07 PROCEDURE — 99203 OFFICE O/P NEW LOW 30 MIN: CPT | Performed by: ORTHOPAEDIC SURGERY

## 2025-08-07 PROCEDURE — 1036F TOBACCO NON-USER: CPT | Performed by: ORTHOPAEDIC SURGERY

## 2025-08-07 PROCEDURE — 20606 DRAIN/INJ JOINT/BURSA W/US: CPT | Mod: LT | Performed by: ORTHOPAEDIC SURGERY

## 2025-08-07 PROCEDURE — 73130 X-RAY EXAM OF HAND: CPT | Mod: 50

## 2025-08-07 RX ADMIN — METHYLPREDNISOLONE ACETATE 30 MG: 40 INJECTION, SUSPENSION INTRA-ARTICULAR; INTRALESIONAL; INTRAMUSCULAR; SOFT TISSUE at 13:49

## 2025-08-07 RX ADMIN — LIDOCAINE HYDROCHLORIDE 1 ML: 10 INJECTION, SOLUTION INFILTRATION; PERINEURAL at 13:48

## 2025-08-07 RX ADMIN — METHYLPREDNISOLONE ACETATE 30 MG: 40 INJECTION, SUSPENSION INTRA-ARTICULAR; INTRALESIONAL; INTRAMUSCULAR; SOFT TISSUE at 13:48

## 2025-08-07 RX ADMIN — LIDOCAINE HYDROCHLORIDE 1 ML: 10 INJECTION, SOLUTION INFILTRATION; PERINEURAL at 13:49

## 2025-08-07 ASSESSMENT — ENCOUNTER SYMPTOMS
ARTHRALGIAS: 1
SHORTNESS OF BREATH: 0
CHILLS: 0
WHEEZING: 0
NUMBNESS: 0
JOINT SWELLING: 1
BRUISES/BLEEDS EASILY: 0
FATIGUE: 0
FEVER: 0

## 2025-08-07 ASSESSMENT — PAIN - FUNCTIONAL ASSESSMENT: PAIN_FUNCTIONAL_ASSESSMENT: 0-10

## 2025-08-07 ASSESSMENT — PAIN SCALES - GENERAL: PAINLEVEL_OUTOF10: 5 - MODERATE PAIN

## 2025-08-07 NOTE — PROGRESS NOTES
Reason for Appointment  Chief Complaint   Patient presents with    Right Hand - Pain     History of Present Illness  New patient is a 54 y.o. female here today for evaluation of bilateral hand pain. She saw her PCP on 7/2/25 for bilateral thumb pain and she was referred to ortho and advised to use Voltaren gel. X-rays taken of the left wrist and hand on 8/7/25 were reviewed and showed severe cmc arthritis, left worse than right. Today she states she has pain with pinching gripping and opening jars. Soreness. She is very active and works out. She likes to golf. No triggering. No numbness or tingling. The pt reports no other recent falls or injuries. Past medical history allergies medications social and family history all reviewed.       Medical History[1]    Surgical History[2]    Medication Documentation Review Audit       Reviewed by Harper Morales MA (Medical Assistant) on 08/07/25 at 1304      Medication Order Taking? Sig Documenting Provider Last Dose Status   Anusol-HC 25 mg suppository [id number] Yes Insert 1 suppository (25 mg) into the rectum 2 times a day as needed for hemorrhoids. Val Tate MD  Active   EPINEPHrine 0.3 mg/0.3 mL injection syringe [id number] Yes Inject 0.3 mL (0.3 mg) into the muscle. Historical Provider, MD  Active   fish oil concentrate (Omega-3) 120-180 mg capsule [id number] Yes Take 1 capsule (1 g) by mouth once daily. Historical Provider, MD  Active   hydrocortisone (Anucort-HC) 25 mg suppository [id number] Yes Insert 1 suppository (25 mg) into the rectum 2 times a day. Val Tate MD  Active   hydrOXYzine HCL (Atarax) 10 mg tablet [id number] Yes Take 1 tablet (10 mg) by mouth every 8 hours if needed for anxiety. Val Taet MD  Active   levonorgestrel (Mirena) 21 mcg/24 hours (8 yrs) 52 mg IUD [id number] Yes Mirena (52 MG) 20 MCG/24HR IUD   Refills: 0       Active Historical Provider, MD  Active   levothyroxine (Synthroid, Levoxyl) 100 mcg tablet  [id number] Yes Take 1 tablet (100 mcg) by mouth once daily. Val Tate MD  Active   multivitamin with minerals (Adults Multivitamin) tablet [id number] Yes Take 1 tablet by mouth once daily. Historical Provider, MD  Active   traZODone (Desyrel) 50 mg tablet [id number] Yes TAKE 1 TABLET AT BEDTIME AS NEEDED FOR SLEEP Val Tate MD  Active   VITAMIN B COMPLEX ORAL [id number] Yes Take 1 tablet by mouth once daily. Historical Provider, MD  Active                    RX Allergies[3]    Review of Systems   Constitutional:  Negative for chills, fatigue and fever.   Respiratory:  Negative for shortness of breath and wheezing.    Cardiovascular:  Negative for chest pain and leg swelling.   Musculoskeletal:  Positive for arthralgias and joint swelling.   Allergic/Immunologic: Negative for immunocompromised state.   Neurological:  Negative for numbness.   Hematological:  Does not bruise/bleed easily.       Exam   Pt is alert awake, orientated to person place and time. No acute distress. Mood is good. Good pulses and good sensation. Negative Lou's sign. No auditory wheezes. No JVD.  No hyperreflexia. No skin changes. Good capillary refill. Bilateral cmc tenderness. Positive cmc grind test. Mps stable. No triggering. She does have some hyperlaxity in the digits.     Assessment   Bilateral cmc arthritis     Plan     We got the pt comfort cool braces today and we discussed brace wear.  We talked about injections and the possibility of future surgery. Today we discussed conservative treatment. At this point, the patient is experiencing pain at the base of the bilateral thumb that is consistent with classic CMC joint osteoarthritis on clinical examination and X-ray images. We will do one injection into the side: bilateral thumb CMC joint in hopes to calm their symptoms nicely. Pt understands the small risk of infection and warning signs including flare reaction. She can use [email or web address].     Patient ID:  Elizabeth Sanchez is a 54 y.o. female.    M Inj/Asp: R radiocarpal (Right cmc) on 8/7/2025 1:48 PM  Indications: pain  Details: ultrasound-guided  Medications: 1 mL lidocaine 10 mg/mL (1 %); 30 mg methylPREDNISolone acetate 40 mg/mL  Outcome: tolerated well, no immediate complications    After discussing the risks and benefits of the procedure we proceeded with the injection. Using ultrasound guidance we obtained and saved images of the thumb CMC joint and subsequently we sterilely injected a mixture of 20 mg of DepoMedrol and .5 cc of 1% lidocaine into the right CMC joint. Pt tolerated the procedure well without any adverse effects.    Procedure, treatment alternatives, risks and benefits explained, specific risks discussed. Consent was given by the patient. Immediately prior to procedure a time out was called to verify the correct patient, procedure, equipment, support staff and site/side marked as required. Patient was prepped and draped in the usual sterile fashion.       M Inj/Asp: L radiocarpal (Left cmc) on 8/7/2025 1:49 PM  Indications: pain  Details: ultrasound-guided  Medications: 1 mL lidocaine 10 mg/mL (1 %); 30 mg methylPREDNISolone acetate 40 mg/mL  Outcome: tolerated well, no immediate complications    After discussing the risks and benefits of the procedure we proceeded with the injection. Using ultrasound guidance we obtained and saved images of the thumb CMC joint and subsequently we sterilely injected a mixture of 20 mg of DepoMedrol and .5 cc of 1% lidocaine into the left CMC joint. Pt tolerated the procedure well without any adverse effects.    Procedure, treatment alternatives, risks and benefits explained, specific risks discussed. Consent was given by the patient. Immediately prior to procedure a time out was called to verify the correct patient, procedure, equipment, support staff and site/side marked as required. Patient was prepped and draped in the usual sterile fashion.           Carmen HU  Ruchi, attest that this documentation has been prepared under the direction and in the presence of Drake Matamoros MD.   By signing below, I, Drake Matamoros MD, personally performed the services described in this documentation. All medical record entries made by the scribe were at my direction and in my presence. I have reviewed the chart and agree that the record reflects my personal performance and is accurate and complete.         [1]   Past Medical History:  Diagnosis Date    Breast cyst     Encounter for full-term uncomplicated delivery      (spontaneous vaginal delivery)    Other specified joint disorders, right hip 2022    Femoroacetabular impingement of right hip    Other sprain of right hip, initial encounter 2022    Acetabular labrum tear, right, initial encounter   [2]   Past Surgical History:  Procedure Laterality Date    HIP SURGERY      OTHER SURGICAL HISTORY  2013    Dental Surgery    OTHER SURGICAL HISTORY  2019    Rhinoplasty   [3]   Allergies  Allergen Reactions    Other Unknown, Itching and Swelling    Penicillins Hives    Sulfa (Sulfonamide Antibiotics) Other     High fever    Latex Rash

## 2025-08-10 RX ORDER — METHYLPREDNISOLONE ACETATE 40 MG/ML
30 INJECTION, SUSPENSION INTRA-ARTICULAR; INTRALESIONAL; INTRAMUSCULAR; SOFT TISSUE
Status: COMPLETED | OUTPATIENT
Start: 2025-08-07 | End: 2025-08-07

## 2025-08-10 RX ORDER — LIDOCAINE HYDROCHLORIDE 10 MG/ML
1 INJECTION, SOLUTION INFILTRATION; PERINEURAL
Status: COMPLETED | OUTPATIENT
Start: 2025-08-07 | End: 2025-08-07

## 2025-08-15 DIAGNOSIS — F51.01 PRIMARY INSOMNIA: ICD-10-CM

## 2025-08-15 RX ORDER — TRAZODONE HYDROCHLORIDE 50 MG/1
50 TABLET ORAL NIGHTLY PRN
Qty: 90 TABLET | Refills: 3 | Status: SHIPPED | OUTPATIENT
Start: 2025-08-15

## 2025-09-03 ENCOUNTER — APPOINTMENT (OUTPATIENT)
Dept: PRIMARY CARE | Facility: CLINIC | Age: 55
End: 2025-09-03
Payer: COMMERCIAL

## 2026-01-14 ENCOUNTER — APPOINTMENT (OUTPATIENT)
Dept: PRIMARY CARE | Facility: CLINIC | Age: 56
End: 2026-01-14
Payer: COMMERCIAL